# Patient Record
Sex: FEMALE | Race: WHITE | NOT HISPANIC OR LATINO | Employment: FULL TIME | URBAN - METROPOLITAN AREA
[De-identification: names, ages, dates, MRNs, and addresses within clinical notes are randomized per-mention and may not be internally consistent; named-entity substitution may affect disease eponyms.]

---

## 2017-02-24 ENCOUNTER — TELEPHONE (OUTPATIENT)
Dept: FAMILY MEDICINE CLINIC | Facility: CLINIC | Age: 67
End: 2017-02-24

## 2017-02-24 RX ORDER — FLUTICASONE PROPIONATE 50 MCG
SPRAY, SUSPENSION (ML) NASAL
Qty: 1 EACH | Refills: 5 | Status: SHIPPED | OUTPATIENT
Start: 2017-02-24

## 2017-02-24 NOTE — TELEPHONE ENCOUNTER
Patient has had allergy symptoms for the past 2 weeks she has tried OTC medications she is requesting something be called into pharmacy [facility]

## 2017-04-03 ENCOUNTER — OFFICE VISIT (OUTPATIENT)
Dept: FAMILY MEDICINE CLINIC | Facility: CLINIC | Age: 67
End: 2017-04-03

## 2017-04-03 VITALS
SYSTOLIC BLOOD PRESSURE: 158 MMHG | HEART RATE: 88 BPM | TEMPERATURE: 98.3 F | DIASTOLIC BLOOD PRESSURE: 90 MMHG | HEIGHT: 63 IN | WEIGHT: 203.4 LBS | OXYGEN SATURATION: 98 % | BODY MASS INDEX: 36.04 KG/M2

## 2017-04-03 DIAGNOSIS — Z00.00 ROUTINE GENERAL MEDICAL EXAMINATION AT A HEALTH CARE FACILITY: ICD-10-CM

## 2017-04-03 DIAGNOSIS — R53.83 FATIGUE, UNSPECIFIED TYPE: Primary | ICD-10-CM

## 2017-04-03 PROCEDURE — 99214 OFFICE O/P EST MOD 30 MIN: CPT | Performed by: NURSE PRACTITIONER

## 2017-04-03 NOTE — PROGRESS NOTES
"Subjective   Rochelle Montejo is a 66 y.o. female.     History of Present Illness   Patient presenting to the office today with feeling very bad and fatigue for the past 4 months.  Within the last 2 weeks as gotten much worse.  She's had the last 4 days off of work and she thought she would feel better but she does not feel any better at all.  She gets good sleep at least 78 hours every night and she still can wake up exhausted.  Her younger sister recently was diagnosed with Hashimoto's and she is wondering if she could possibly have the same thing.  That's the only person her family that she is aware of that has a thyroid problem.  She also has been expressing some dry skin she's unsure if she's had any weight changes.    The following portions of the patient's history were reviewed and updated as appropriate: allergies, current medications, past social history and problem list.    Review of Systems   Constitutional: Positive for fatigue.   All other systems reviewed and are negative.      Objective   /90 (BP Location: Right arm, Patient Position: Sitting)  Pulse 88  Temp 98.3 °F (36.8 °C)  Ht 63\" (160 cm)  Wt 203 lb 6.4 oz (92.3 kg)  SpO2 98%  BMI 36.03 kg/m2  Physical Exam   Constitutional: She is oriented to person, place, and time. Vital signs are normal. She appears well-developed and well-nourished. No distress.   HENT:   Head: Normocephalic.   Cardiovascular: Normal rate, regular rhythm and normal heart sounds.    Pulmonary/Chest: Effort normal and breath sounds normal.   Neurological: She is alert and oriented to person, place, and time. Gait normal.   Psychiatric: She has a normal mood and affect. Her behavior is normal. Judgment and thought content normal.   Vitals reviewed.      Assessment/Plan   Problem List Items Addressed This Visit        Other    Fatigue - Primary    Relevant Orders    CBC & Differential    TSH    Iron Profile    T4, free    T3, free    Thyroid Peroxidase Antibody    "   Other Visit Diagnoses     Routine general medical examination at a health care facility        Relevant Orders    CBC & Differential    Comprehensive Metabolic Panel    Hemoglobin A1c    Lipid Panel With LDL / HDL Ratio        Follow up after labs

## 2017-04-04 LAB
ALBUMIN SERPL-MCNC: 4.3 G/DL (ref 3.5–5.2)
ALBUMIN/GLOB SERPL: 1.7 G/DL
ALP SERPL-CCNC: 51 U/L (ref 39–117)
ALT SERPL-CCNC: 14 U/L (ref 1–33)
AST SERPL-CCNC: 13 U/L (ref 1–32)
BASOPHILS # BLD AUTO: 0.04 10*3/MM3 (ref 0–0.2)
BASOPHILS NFR BLD AUTO: 0.7 % (ref 0–1.5)
BILIRUB SERPL-MCNC: 0.2 MG/DL (ref 0.1–1.2)
BUN SERPL-MCNC: 15 MG/DL (ref 8–23)
BUN/CREAT SERPL: 22.4 (ref 7–25)
CALCIUM SERPL-MCNC: 9.5 MG/DL (ref 8.6–10.5)
CHLORIDE SERPL-SCNC: 103 MMOL/L (ref 98–107)
CHOLEST SERPL-MCNC: 192 MG/DL (ref 0–200)
CO2 SERPL-SCNC: 24.7 MMOL/L (ref 22–29)
CREAT SERPL-MCNC: 0.67 MG/DL (ref 0.57–1)
EOSINOPHIL # BLD AUTO: 0.27 10*3/MM3 (ref 0–0.7)
EOSINOPHIL NFR BLD AUTO: 4.9 % (ref 0.3–6.2)
ERYTHROCYTE [DISTWIDTH] IN BLOOD BY AUTOMATED COUNT: 13.9 % (ref 11.7–13)
GLOBULIN SER CALC-MCNC: 2.6 GM/DL
GLUCOSE SERPL-MCNC: 106 MG/DL (ref 65–99)
HBA1C MFR BLD: 5.27 % (ref 4.8–5.6)
HCT VFR BLD AUTO: 43.7 % (ref 35.6–45.5)
HDLC SERPL-MCNC: 54 MG/DL (ref 40–60)
HGB BLD-MCNC: 14.3 G/DL (ref 11.9–15.5)
IMM GRANULOCYTES # BLD: 0 10*3/MM3 (ref 0–0.03)
IMM GRANULOCYTES NFR BLD: 0 % (ref 0–0.5)
IRON SATN MFR SERPL: 15 % (ref 20–50)
IRON SERPL-MCNC: 51 MCG/DL (ref 37–145)
LDLC SERPL CALC-MCNC: 113 MG/DL (ref 0–100)
LDLC/HDLC SERPL: 2.1 {RATIO}
LYMPHOCYTES # BLD AUTO: 1.95 10*3/MM3 (ref 0.9–4.8)
LYMPHOCYTES NFR BLD AUTO: 35.5 % (ref 19.6–45.3)
MCH RBC QN AUTO: 30 PG (ref 26.9–32)
MCHC RBC AUTO-ENTMCNC: 32.7 G/DL (ref 32.4–36.3)
MCV RBC AUTO: 91.6 FL (ref 80.5–98.2)
MONOCYTES # BLD AUTO: 0.64 10*3/MM3 (ref 0.2–1.2)
MONOCYTES NFR BLD AUTO: 11.6 % (ref 5–12)
NEUTROPHILS # BLD AUTO: 2.6 10*3/MM3 (ref 1.9–8.1)
NEUTROPHILS NFR BLD AUTO: 47.3 % (ref 42.7–76)
PLATELET # BLD AUTO: 260 10*3/MM3 (ref 140–500)
POTASSIUM SERPL-SCNC: 4.3 MMOL/L (ref 3.5–5.2)
PROT SERPL-MCNC: 6.9 G/DL (ref 6–8.5)
RBC # BLD AUTO: 4.77 10*6/MM3 (ref 3.9–5.2)
SODIUM SERPL-SCNC: 140 MMOL/L (ref 136–145)
T3FREE SERPL-MCNC: 3 PG/ML (ref 2–4.4)
T4 FREE SERPL-MCNC: 1.32 NG/DL (ref 0.93–1.7)
THYROPEROXIDASE AB SERPL-ACNC: 117 IU/ML (ref 0–34)
TIBC SERPL-MCNC: 344 MCG/DL
TRIGL SERPL-MCNC: 123 MG/DL (ref 0–150)
TSH SERPL DL<=0.005 MIU/L-ACNC: 2.25 MIU/ML (ref 0.27–4.2)
UIBC SERPL-MCNC: 293 MCG/DL
VLDLC SERPL CALC-MCNC: 24.6 MG/DL (ref 5–40)
WBC # BLD AUTO: 5.5 10*3/MM3 (ref 4.5–10.7)

## 2017-04-08 ENCOUNTER — RESULTS ENCOUNTER (OUTPATIENT)
Dept: FAMILY MEDICINE CLINIC | Facility: CLINIC | Age: 67
End: 2017-04-08

## 2017-04-08 DIAGNOSIS — R53.83 FATIGUE, UNSPECIFIED TYPE: ICD-10-CM

## 2017-04-11 DIAGNOSIS — R76.8 THYROID ANTIBODY POSITIVE: Primary | ICD-10-CM

## 2023-05-13 ENCOUNTER — APPOINTMENT (OUTPATIENT)
Dept: ULTRASOUND IMAGING | Facility: HOSPITAL | Age: 73
End: 2023-05-13
Payer: MEDICARE

## 2023-05-13 ENCOUNTER — APPOINTMENT (OUTPATIENT)
Dept: GENERAL RADIOLOGY | Facility: HOSPITAL | Age: 73
End: 2023-05-13
Payer: MEDICARE

## 2023-05-13 ENCOUNTER — HOSPITAL ENCOUNTER (OUTPATIENT)
Facility: HOSPITAL | Age: 73
LOS: 1 days | Discharge: HOME | End: 2023-05-14
Attending: EMERGENCY MEDICINE | Admitting: SURGERY
Payer: MEDICARE

## 2023-05-13 ENCOUNTER — APPOINTMENT (OUTPATIENT)
Dept: CT IMAGING | Facility: HOSPITAL | Age: 73
End: 2023-05-13
Payer: MEDICARE

## 2023-05-13 ENCOUNTER — ANESTHESIA (OUTPATIENT)
Dept: PERIOP | Facility: HOSPITAL | Age: 73
End: 2023-05-13
Payer: MEDICARE

## 2023-05-13 ENCOUNTER — ANESTHESIA EVENT (OUTPATIENT)
Dept: PERIOP | Facility: HOSPITAL | Age: 73
End: 2023-05-13
Payer: MEDICARE

## 2023-05-13 DIAGNOSIS — K81.0 CHOLECYSTITIS, ACUTE: ICD-10-CM

## 2023-05-13 DIAGNOSIS — K81.9 CHOLECYSTITIS: Primary | ICD-10-CM

## 2023-05-13 LAB
ALBUMIN SERPL-MCNC: 4 G/DL (ref 3.5–5.2)
ALBUMIN/GLOB SERPL: 1.3 G/DL
ALP SERPL-CCNC: 64 U/L (ref 39–117)
ALT SERPL W P-5'-P-CCNC: 17 U/L (ref 1–33)
ANION GAP SERPL CALCULATED.3IONS-SCNC: 12.2 MMOL/L (ref 5–15)
AST SERPL-CCNC: 22 U/L (ref 1–32)
BACTERIA UR QL AUTO: ABNORMAL /HPF
BASOPHILS # BLD AUTO: 0.05 10*3/MM3 (ref 0–0.2)
BASOPHILS NFR BLD AUTO: 0.8 % (ref 0–1.5)
BILIRUB SERPL-MCNC: 0.3 MG/DL (ref 0–1.2)
BILIRUB UR QL STRIP: NEGATIVE
BUN SERPL-MCNC: 25 MG/DL (ref 8–23)
BUN/CREAT SERPL: 22.3 (ref 7–25)
CALCIUM SPEC-SCNC: 9.9 MG/DL (ref 8.6–10.5)
CHLORIDE SERPL-SCNC: 103 MMOL/L (ref 98–107)
CLARITY UR: CLEAR
CO2 SERPL-SCNC: 25.8 MMOL/L (ref 22–29)
COLOR UR: YELLOW
CREAT SERPL-MCNC: 1.12 MG/DL (ref 0.57–1)
DEPRECATED RDW RBC AUTO: 47.4 FL (ref 37–54)
EGFRCR SERPLBLD CKD-EPI 2021: 53 ML/MIN/1.73
EOSINOPHIL # BLD AUTO: 0.2 10*3/MM3 (ref 0–0.4)
EOSINOPHIL NFR BLD AUTO: 3.2 % (ref 0.3–6.2)
ERYTHROCYTE [DISTWIDTH] IN BLOOD BY AUTOMATED COUNT: 13.8 % (ref 12.3–15.4)
GLOBULIN UR ELPH-MCNC: 3.1 GM/DL
GLUCOSE SERPL-MCNC: 117 MG/DL (ref 65–99)
GLUCOSE UR STRIP-MCNC: NEGATIVE MG/DL
HCT VFR BLD AUTO: 40.1 % (ref 34–46.6)
HGB BLD-MCNC: 13.4 G/DL (ref 12–15.9)
HGB UR QL STRIP.AUTO: ABNORMAL
HYALINE CASTS UR QL AUTO: ABNORMAL /LPF
IMM GRANULOCYTES # BLD AUTO: 0.01 10*3/MM3 (ref 0–0.05)
IMM GRANULOCYTES NFR BLD AUTO: 0.2 % (ref 0–0.5)
KETONES UR QL STRIP: NEGATIVE
LEUKOCYTE ESTERASE UR QL STRIP.AUTO: ABNORMAL
LIPASE SERPL-CCNC: 30 U/L (ref 13–60)
LYMPHOCYTES # BLD AUTO: 1.73 10*3/MM3 (ref 0.7–3.1)
LYMPHOCYTES NFR BLD AUTO: 27.4 % (ref 19.6–45.3)
MCH RBC QN AUTO: 31 PG (ref 26.6–33)
MCHC RBC AUTO-ENTMCNC: 33.4 G/DL (ref 31.5–35.7)
MCV RBC AUTO: 92.8 FL (ref 79–97)
MONOCYTES # BLD AUTO: 0.94 10*3/MM3 (ref 0.1–0.9)
MONOCYTES NFR BLD AUTO: 14.9 % (ref 5–12)
NEUTROPHILS NFR BLD AUTO: 3.39 10*3/MM3 (ref 1.7–7)
NEUTROPHILS NFR BLD AUTO: 53.5 % (ref 42.7–76)
NITRITE UR QL STRIP: NEGATIVE
NRBC BLD AUTO-RTO: 0 /100 WBC (ref 0–0.2)
PH UR STRIP.AUTO: 7 [PH] (ref 4.5–8)
PLATELET # BLD AUTO: 234 10*3/MM3 (ref 140–450)
PMV BLD AUTO: 10.8 FL (ref 6–12)
POTASSIUM SERPL-SCNC: 3.1 MMOL/L (ref 3.5–5.2)
PROT SERPL-MCNC: 7.1 G/DL (ref 6–8.5)
PROT UR QL STRIP: NEGATIVE
QT INTERVAL: 398 MS
RBC # BLD AUTO: 4.32 10*6/MM3 (ref 3.77–5.28)
RBC # UR STRIP: ABNORMAL /HPF
REF LAB TEST METHOD: ABNORMAL
SODIUM SERPL-SCNC: 141 MMOL/L (ref 136–145)
SP GR UR STRIP: 1.01 (ref 1–1.03)
SQUAMOUS #/AREA URNS HPF: ABNORMAL /HPF
UROBILINOGEN UR QL STRIP: ABNORMAL
WBC # UR STRIP: ABNORMAL /HPF
WBC NRBC COR # BLD: 6.32 10*3/MM3 (ref 3.4–10.8)

## 2023-05-13 PROCEDURE — 25010000002 ONDANSETRON PER 1 MG: Performed by: EMERGENCY MEDICINE

## 2023-05-13 PROCEDURE — 87086 URINE CULTURE/COLONY COUNT: CPT | Performed by: EMERGENCY MEDICINE

## 2023-05-13 PROCEDURE — 25010000002 SUCCINYLCHOLINE PER 20 MG: Performed by: REGISTERED NURSE

## 2023-05-13 PROCEDURE — 74300 X-RAY BILE DUCTS/PANCREAS: CPT

## 2023-05-13 PROCEDURE — 94799 UNLISTED PULMONARY SVC/PX: CPT

## 2023-05-13 PROCEDURE — 83690 ASSAY OF LIPASE: CPT | Performed by: EMERGENCY MEDICINE

## 2023-05-13 PROCEDURE — 99285 EMERGENCY DEPT VISIT HI MDM: CPT

## 2023-05-13 PROCEDURE — 25010000002 KETOROLAC TROMETHAMINE PER 15 MG: Performed by: REGISTERED NURSE

## 2023-05-13 PROCEDURE — 25010000002 DEXAMETHASONE PER 1 MG: Performed by: REGISTERED NURSE

## 2023-05-13 PROCEDURE — 93010 ELECTROCARDIOGRAM REPORT: CPT | Performed by: INTERNAL MEDICINE

## 2023-05-13 PROCEDURE — 96374 THER/PROPH/DIAG INJ IV PUSH: CPT

## 2023-05-13 PROCEDURE — 76705 ECHO EXAM OF ABDOMEN: CPT

## 2023-05-13 PROCEDURE — 47563 LAPARO CHOLECYSTECTOMY/GRAPH: CPT | Performed by: SURGERY

## 2023-05-13 PROCEDURE — 93005 ELECTROCARDIOGRAM TRACING: CPT | Performed by: EMERGENCY MEDICINE

## 2023-05-13 PROCEDURE — 25010000002 NEOSTIGMINE 10 MG/10ML SOLUTION: Performed by: REGISTERED NURSE

## 2023-05-13 PROCEDURE — 25510000001 IOPAMIDOL 61 % SOLUTION: Performed by: SURGERY

## 2023-05-13 PROCEDURE — 88304 TISSUE EXAM BY PATHOLOGIST: CPT | Performed by: SURGERY

## 2023-05-13 PROCEDURE — 25510000001 IOPAMIDOL PER 1 ML: Performed by: EMERGENCY MEDICINE

## 2023-05-13 PROCEDURE — 25010000002 FENTANYL CITRATE (PF) 50 MCG/ML SOLUTION: Performed by: REGISTERED NURSE

## 2023-05-13 PROCEDURE — 25010000002 MIDAZOLAM PER 1MG: Performed by: REGISTERED NURSE

## 2023-05-13 PROCEDURE — 85025 COMPLETE CBC W/AUTO DIFF WBC: CPT | Performed by: EMERGENCY MEDICINE

## 2023-05-13 PROCEDURE — 81001 URINALYSIS AUTO W/SCOPE: CPT | Performed by: EMERGENCY MEDICINE

## 2023-05-13 PROCEDURE — 25010000002 PROPOFOL 200 MG/20ML EMULSION: Performed by: REGISTERED NURSE

## 2023-05-13 PROCEDURE — 25010000002 KETOROLAC TROMETHAMINE PER 15 MG: Performed by: EMERGENCY MEDICINE

## 2023-05-13 PROCEDURE — 0 CEFAZOLIN SODIUM-DEXTROSE 2-3 GM-%(50ML) RECONSTITUTED SOLUTION: Performed by: REGISTERED NURSE

## 2023-05-13 PROCEDURE — 94761 N-INVAS EAR/PLS OXIMETRY MLT: CPT

## 2023-05-13 PROCEDURE — 25010000002 ONDANSETRON PER 1 MG: Performed by: REGISTERED NURSE

## 2023-05-13 PROCEDURE — 74177 CT ABD & PELVIS W/CONTRAST: CPT

## 2023-05-13 PROCEDURE — 99223 1ST HOSP IP/OBS HIGH 75: CPT | Performed by: SURGERY

## 2023-05-13 PROCEDURE — 80053 COMPREHEN METABOLIC PANEL: CPT | Performed by: EMERGENCY MEDICINE

## 2023-05-13 PROCEDURE — 96375 TX/PRO/DX INJ NEW DRUG ADDON: CPT

## 2023-05-13 DEVICE — CLIP LIGAT VASC HORIZON TI MD/LG GRN 6CT: Type: IMPLANTABLE DEVICE | Site: ABDOMEN | Status: FUNCTIONAL

## 2023-05-13 RX ORDER — TRAMADOL HYDROCHLORIDE 50 MG/1
50 TABLET ORAL EVERY 6 HOURS PRN
Status: ON HOLD | COMMUNITY
End: 2023-05-14 | Stop reason: SDUPTHER

## 2023-05-13 RX ORDER — CALCIUM CARBONATE 500 MG/1
2 TABLET, CHEWABLE ORAL 2 TIMES DAILY PRN
Status: DISCONTINUED | OUTPATIENT
Start: 2023-05-13 | End: 2023-05-14 | Stop reason: HOSPADM

## 2023-05-13 RX ORDER — GLYCOPYRROLATE 0.2 MG/ML
INJECTION INTRAMUSCULAR; INTRAVENOUS AS NEEDED
Status: DISCONTINUED | OUTPATIENT
Start: 2023-05-13 | End: 2023-05-13 | Stop reason: SURG

## 2023-05-13 RX ORDER — HYDROMORPHONE HYDROCHLORIDE 1 MG/ML
0.5 INJECTION, SOLUTION INTRAMUSCULAR; INTRAVENOUS; SUBCUTANEOUS
Status: DISCONTINUED | OUTPATIENT
Start: 2023-05-13 | End: 2023-05-14 | Stop reason: HOSPADM

## 2023-05-13 RX ORDER — CEFAZOLIN SODIUM 2 G/50ML
SOLUTION INTRAVENOUS AS NEEDED
Status: DISCONTINUED | OUTPATIENT
Start: 2023-05-13 | End: 2023-05-13 | Stop reason: SURG

## 2023-05-13 RX ORDER — PROPOFOL 10 MG/ML
INJECTION, EMULSION INTRAVENOUS AS NEEDED
Status: DISCONTINUED | OUTPATIENT
Start: 2023-05-13 | End: 2023-05-13 | Stop reason: SURG

## 2023-05-13 RX ORDER — KETOROLAC TROMETHAMINE 30 MG/ML
INJECTION, SOLUTION INTRAMUSCULAR; INTRAVENOUS AS NEEDED
Status: DISCONTINUED | OUTPATIENT
Start: 2023-05-13 | End: 2023-05-13 | Stop reason: SURG

## 2023-05-13 RX ORDER — KETAMINE HYDROCHLORIDE 100 MG/ML
INJECTION INTRAMUSCULAR; INTRAVENOUS AS NEEDED
Status: DISCONTINUED | OUTPATIENT
Start: 2023-05-13 | End: 2023-05-13 | Stop reason: SURG

## 2023-05-13 RX ORDER — FAMOTIDINE 10 MG/ML
INJECTION, SOLUTION INTRAVENOUS AS NEEDED
Status: DISCONTINUED | OUTPATIENT
Start: 2023-05-13 | End: 2023-05-13 | Stop reason: SURG

## 2023-05-13 RX ORDER — SODIUM CHLORIDE 0.9 % (FLUSH) 0.9 %
10 SYRINGE (ML) INJECTION AS NEEDED
Status: DISCONTINUED | OUTPATIENT
Start: 2023-05-13 | End: 2023-05-14 | Stop reason: HOSPADM

## 2023-05-13 RX ORDER — KETOROLAC TROMETHAMINE 30 MG/ML
15 INJECTION, SOLUTION INTRAMUSCULAR; INTRAVENOUS ONCE
Status: COMPLETED | OUTPATIENT
Start: 2023-05-13 | End: 2023-05-13

## 2023-05-13 RX ORDER — SODIUM CHLORIDE, SODIUM LACTATE, POTASSIUM CHLORIDE, CALCIUM CHLORIDE 600; 310; 30; 20 MG/100ML; MG/100ML; MG/100ML; MG/100ML
9 INJECTION, SOLUTION INTRAVENOUS CONTINUOUS
Status: DISCONTINUED | OUTPATIENT
Start: 2023-05-13 | End: 2023-05-14 | Stop reason: HOSPADM

## 2023-05-13 RX ORDER — NEOSTIGMINE METHYLSULFATE 1 MG/ML
INJECTION, SOLUTION INTRAVENOUS AS NEEDED
Status: DISCONTINUED | OUTPATIENT
Start: 2023-05-13 | End: 2023-05-13 | Stop reason: SURG

## 2023-05-13 RX ORDER — SODIUM CHLORIDE 0.9 % (FLUSH) 0.9 %
10 SYRINGE (ML) INJECTION EVERY 12 HOURS SCHEDULED
Status: DISCONTINUED | OUTPATIENT
Start: 2023-05-13 | End: 2023-05-14 | Stop reason: HOSPADM

## 2023-05-13 RX ORDER — FENTANYL CITRATE 50 UG/ML
INJECTION, SOLUTION INTRAMUSCULAR; INTRAVENOUS AS NEEDED
Status: DISCONTINUED | OUTPATIENT
Start: 2023-05-13 | End: 2023-05-13 | Stop reason: SURG

## 2023-05-13 RX ORDER — SODIUM CHLORIDE, SODIUM LACTATE, POTASSIUM CHLORIDE, CALCIUM CHLORIDE 600; 310; 30; 20 MG/100ML; MG/100ML; MG/100ML; MG/100ML
100 INJECTION, SOLUTION INTRAVENOUS CONTINUOUS
Status: DISCONTINUED | OUTPATIENT
Start: 2023-05-13 | End: 2023-05-14 | Stop reason: HOSPADM

## 2023-05-13 RX ORDER — SUCCINYLCHOLINE CHLORIDE 20 MG/ML
INJECTION INTRAMUSCULAR; INTRAVENOUS AS NEEDED
Status: DISCONTINUED | OUTPATIENT
Start: 2023-05-13 | End: 2023-05-13 | Stop reason: SURG

## 2023-05-13 RX ORDER — ROCURONIUM BROMIDE 10 MG/ML
INJECTION, SOLUTION INTRAVENOUS AS NEEDED
Status: DISCONTINUED | OUTPATIENT
Start: 2023-05-13 | End: 2023-05-13 | Stop reason: SURG

## 2023-05-13 RX ORDER — IBUPROFEN 400 MG/1
400 TABLET ORAL EVERY 6 HOURS PRN
Status: DISCONTINUED | OUTPATIENT
Start: 2023-05-13 | End: 2023-05-14 | Stop reason: HOSPADM

## 2023-05-13 RX ORDER — ACETAMINOPHEN 500 MG
1000 TABLET ORAL 3 TIMES DAILY
Status: DISCONTINUED | OUTPATIENT
Start: 2023-05-13 | End: 2023-05-14 | Stop reason: HOSPADM

## 2023-05-13 RX ORDER — ONDANSETRON 2 MG/ML
INJECTION INTRAMUSCULAR; INTRAVENOUS AS NEEDED
Status: DISCONTINUED | OUTPATIENT
Start: 2023-05-13 | End: 2023-05-13 | Stop reason: SURG

## 2023-05-13 RX ORDER — SODIUM CHLORIDE 9 MG/ML
40 INJECTION, SOLUTION INTRAVENOUS AS NEEDED
Status: DISCONTINUED | OUTPATIENT
Start: 2023-05-13 | End: 2023-05-14 | Stop reason: HOSPADM

## 2023-05-13 RX ORDER — NALOXONE HCL 0.4 MG/ML
0.4 VIAL (ML) INJECTION
Status: DISCONTINUED | OUTPATIENT
Start: 2023-05-13 | End: 2023-05-14 | Stop reason: HOSPADM

## 2023-05-13 RX ORDER — ONDANSETRON 2 MG/ML
4 INJECTION INTRAMUSCULAR; INTRAVENOUS ONCE
Status: COMPLETED | OUTPATIENT
Start: 2023-05-13 | End: 2023-05-13

## 2023-05-13 RX ORDER — LIDOCAINE HYDROCHLORIDE 10 MG/ML
0.5 INJECTION, SOLUTION EPIDURAL; INFILTRATION; INTRACAUDAL; PERINEURAL ONCE AS NEEDED
Status: DISCONTINUED | OUTPATIENT
Start: 2023-05-13 | End: 2023-05-13 | Stop reason: HOSPADM

## 2023-05-13 RX ORDER — ONDANSETRON 2 MG/ML
4 INJECTION INTRAMUSCULAR; INTRAVENOUS EVERY 6 HOURS PRN
Status: DISCONTINUED | OUTPATIENT
Start: 2023-05-13 | End: 2023-05-14 | Stop reason: HOSPADM

## 2023-05-13 RX ORDER — ONDANSETRON 2 MG/ML
4 INJECTION INTRAMUSCULAR; INTRAVENOUS ONCE AS NEEDED
Status: DISCONTINUED | OUTPATIENT
Start: 2023-05-13 | End: 2023-05-13 | Stop reason: HOSPADM

## 2023-05-13 RX ORDER — TRAMADOL HYDROCHLORIDE 50 MG/1
50 TABLET ORAL EVERY 6 HOURS PRN
Status: DISCONTINUED | OUTPATIENT
Start: 2023-05-13 | End: 2023-05-14 | Stop reason: HOSPADM

## 2023-05-13 RX ORDER — MAGNESIUM HYDROXIDE 1200 MG/15ML
LIQUID ORAL AS NEEDED
Status: DISCONTINUED | OUTPATIENT
Start: 2023-05-13 | End: 2023-05-13 | Stop reason: HOSPADM

## 2023-05-13 RX ORDER — MIDAZOLAM HYDROCHLORIDE 2 MG/2ML
INJECTION, SOLUTION INTRAMUSCULAR; INTRAVENOUS AS NEEDED
Status: DISCONTINUED | OUTPATIENT
Start: 2023-05-13 | End: 2023-05-13 | Stop reason: SURG

## 2023-05-13 RX ORDER — ONDANSETRON 4 MG/1
4 TABLET, FILM COATED ORAL EVERY 6 HOURS PRN
Status: DISCONTINUED | OUTPATIENT
Start: 2023-05-13 | End: 2023-05-14 | Stop reason: HOSPADM

## 2023-05-13 RX ORDER — BUPIVACAINE HYDROCHLORIDE AND EPINEPHRINE 5; 5 MG/ML; UG/ML
INJECTION, SOLUTION EPIDURAL; INTRACAUDAL; PERINEURAL AS NEEDED
Status: DISCONTINUED | OUTPATIENT
Start: 2023-05-13 | End: 2023-05-13 | Stop reason: HOSPADM

## 2023-05-13 RX ORDER — DEXAMETHASONE SODIUM PHOSPHATE 4 MG/ML
INJECTION, SOLUTION INTRA-ARTICULAR; INTRALESIONAL; INTRAMUSCULAR; INTRAVENOUS; SOFT TISSUE AS NEEDED
Status: DISCONTINUED | OUTPATIENT
Start: 2023-05-13 | End: 2023-05-13 | Stop reason: SURG

## 2023-05-13 RX ORDER — SODIUM CHLORIDE 9 MG/ML
INJECTION, SOLUTION INTRAVENOUS AS NEEDED
Status: DISCONTINUED | OUTPATIENT
Start: 2023-05-13 | End: 2023-05-13 | Stop reason: HOSPADM

## 2023-05-13 RX ORDER — DEXMEDETOMIDINE HYDROCHLORIDE 100 UG/ML
INJECTION, SOLUTION INTRAVENOUS AS NEEDED
Status: DISCONTINUED | OUTPATIENT
Start: 2023-05-13 | End: 2023-05-13 | Stop reason: SURG

## 2023-05-13 RX ORDER — FENTANYL CITRATE 50 UG/ML
25 INJECTION, SOLUTION INTRAMUSCULAR; INTRAVENOUS
Status: DISCONTINUED | OUTPATIENT
Start: 2023-05-13 | End: 2023-05-13 | Stop reason: HOSPADM

## 2023-05-13 RX ORDER — PANTOPRAZOLE SODIUM 40 MG/1
40 TABLET, DELAYED RELEASE ORAL
Status: DISCONTINUED | OUTPATIENT
Start: 2023-05-13 | End: 2023-05-14 | Stop reason: HOSPADM

## 2023-05-13 RX ADMIN — DEXAMETHASONE SODIUM PHOSPHATE 4 MG: 4 INJECTION, SOLUTION INTRAMUSCULAR; INTRAVENOUS at 13:30

## 2023-05-13 RX ADMIN — MIDAZOLAM HYDROCHLORIDE 2 MG: 1 INJECTION, SOLUTION INTRAMUSCULAR; INTRAVENOUS at 13:37

## 2023-05-13 RX ADMIN — FENTANYL CITRATE 50 MCG: 50 INJECTION, SOLUTION INTRAMUSCULAR; INTRAVENOUS at 13:50

## 2023-05-13 RX ADMIN — DEXMEDETOMIDINE 4 MCG: 100 INJECTION, SOLUTION, CONCENTRATE INTRAVENOUS at 13:48

## 2023-05-13 RX ADMIN — KETOROLAC TROMETHAMINE 15 MG: 30 INJECTION, SOLUTION INTRAMUSCULAR; INTRAVENOUS at 07:40

## 2023-05-13 RX ADMIN — ONDANSETRON 4 MG: 2 INJECTION INTRAMUSCULAR; INTRAVENOUS at 13:30

## 2023-05-13 RX ADMIN — IOPAMIDOL 50 ML: 755 INJECTION, SOLUTION INTRAVENOUS at 09:00

## 2023-05-13 RX ADMIN — ACETAMINOPHEN 1000 MG: 500 TABLET ORAL at 16:58

## 2023-05-13 RX ADMIN — KETAMINE HYDROCHLORIDE 10 MG: 100 INJECTION INTRAMUSCULAR; INTRAVENOUS at 14:34

## 2023-05-13 RX ADMIN — DEXMEDETOMIDINE 4 MCG: 100 INJECTION, SOLUTION, CONCENTRATE INTRAVENOUS at 14:34

## 2023-05-13 RX ADMIN — KETAMINE HYDROCHLORIDE 10 MG: 100 INJECTION INTRAMUSCULAR; INTRAVENOUS at 13:40

## 2023-05-13 RX ADMIN — KETOROLAC TROMETHAMINE 15 MG: 30 INJECTION, SOLUTION INTRAMUSCULAR; INTRAVENOUS at 14:44

## 2023-05-13 RX ADMIN — SUCCINYLCHOLINE CHLORIDE 110 MG: 20 INJECTION, SOLUTION INTRAMUSCULAR; INTRAVENOUS at 13:40

## 2023-05-13 RX ADMIN — GLYCOPYRROLATE 0.6 MG: 0.2 INJECTION INTRAMUSCULAR; INTRAVENOUS at 14:41

## 2023-05-13 RX ADMIN — SODIUM CHLORIDE, POTASSIUM CHLORIDE, SODIUM LACTATE AND CALCIUM CHLORIDE 9 ML/HR: 600; 310; 30; 20 INJECTION, SOLUTION INTRAVENOUS at 13:17

## 2023-05-13 RX ADMIN — CEFAZOLIN SODIUM 2 G: 2 SOLUTION INTRAVENOUS at 13:45

## 2023-05-13 RX ADMIN — KETAMINE HYDROCHLORIDE 10 MG: 100 INJECTION INTRAMUSCULAR; INTRAVENOUS at 14:14

## 2023-05-13 RX ADMIN — PROPOFOL 50 MG: 10 INJECTION, EMULSION INTRAVENOUS at 13:50

## 2023-05-13 RX ADMIN — Medication 10 ML: at 21:01

## 2023-05-13 RX ADMIN — PANTOPRAZOLE SODIUM 40 MG: 40 TABLET, DELAYED RELEASE ORAL at 22:08

## 2023-05-13 RX ADMIN — NEOSTIGMINE METHYLSULFATE 4 MG: 0.5 INJECTION INTRAVENOUS at 14:41

## 2023-05-13 RX ADMIN — ONDANSETRON 4 MG: 2 INJECTION INTRAMUSCULAR; INTRAVENOUS at 07:39

## 2023-05-13 RX ADMIN — SODIUM CHLORIDE, POTASSIUM CHLORIDE, SODIUM LACTATE AND CALCIUM CHLORIDE 100 ML/HR: 600; 310; 30; 20 INJECTION, SOLUTION INTRAVENOUS at 15:58

## 2023-05-13 RX ADMIN — FENTANYL CITRATE 50 MCG: 50 INJECTION, SOLUTION INTRAMUSCULAR; INTRAVENOUS at 13:40

## 2023-05-13 RX ADMIN — TRAMADOL HYDROCHLORIDE 50 MG: 50 TABLET ORAL at 17:19

## 2023-05-13 RX ADMIN — DEXMEDETOMIDINE 4 MCG: 100 INJECTION, SOLUTION, CONCENTRATE INTRAVENOUS at 14:15

## 2023-05-13 RX ADMIN — FAMOTIDINE 20 MG: 10 INJECTION INTRAVENOUS at 13:31

## 2023-05-13 RX ADMIN — ACETAMINOPHEN 1000 MG: 500 TABLET ORAL at 21:01

## 2023-05-13 RX ADMIN — DEXMEDETOMIDINE 4 MCG: 100 INJECTION, SOLUTION, CONCENTRATE INTRAVENOUS at 13:41

## 2023-05-13 RX ADMIN — PROPOFOL 175 MG: 10 INJECTION, EMULSION INTRAVENOUS at 13:40

## 2023-05-13 RX ADMIN — ROCURONIUM BROMIDE 35 MG: 10 INJECTION INTRAVENOUS at 13:47

## 2023-05-13 RX ADMIN — FENTANYL CITRATE 50 MCG: 50 INJECTION, SOLUTION INTRAMUSCULAR; INTRAVENOUS at 14:13

## 2023-05-13 NOTE — ANESTHESIA POSTPROCEDURE EVALUATION
Patient: Rochelle Montejo    Procedure Summary     Date: 05/13/23 Room / Location:  [facility] /  [facility]    Anesthesia Start: 1338 Anesthesia Stop: 1505    Procedure: CHOLECYSTECTOMY LAPAROSCOPIC INTRAOPERATIVE CHOLANGIOGRAM (Abdomen) Diagnosis:       Cholecystitis, acute      (acute cholecystitis)    Surgeons: Desi De Dios MD Provider: Omid Sloan CRNA    Anesthesia Type: general ASA Status: 2          Anesthesia Type: general    Vitals  Vitals Value Taken Time   /57 05/13/23 1535   Temp 98.4 °F (36.9 °C) 05/13/23 1507   Pulse 76 05/13/23 1537   Resp 20 05/13/23 1535   SpO2 95 % 05/13/23 1537   Vitals shown include unvalidated device data.        Post Anesthesia Care and Evaluation    Patient location during evaluation: bedside  Patient participation: complete - patient participated  Level of consciousness: awake and alert  Pain score: 0  Pain management: adequate    Airway patency: patent  Anesthetic complications: No anesthetic complications  PONV Status: none  Cardiovascular status: acceptable  Respiratory status: acceptable  Hydration status: acceptable

## 2023-05-13 NOTE — ED NOTES
"Pt continues to rock back and forth in bed c/o \"severe pain and the pain medication hasn't done anything\" Dr Piña notified  "

## 2023-05-13 NOTE — ANESTHESIA PROCEDURE NOTES
Airway  Date/Time: 5/13/2023 1:41 PM  Airway not difficult    General Information and Staff    Patient location during procedure: OR  CRNA/CAA: Omid Sloan CRNA    Indications and Patient Condition  Indications for airway management: airway protection    Preoxygenated: yes  MILS maintained throughout  Mask difficulty assessment: 1 - vent by mask    Final Airway Details  Final airway type: endotracheal airway      Successful airway: ETT  Cuffed: yes   Successful intubation technique: direct laryngoscopy  Facilitating devices/methods: intubating stylet  Endotracheal tube insertion site: oral  Blade: Cross  Blade size: 2  ETT size (mm): 7.5  Cormack-Lehane Classification: grade IIa - partial view of glottis  Placement verified by: chest auscultation and capnometry   Measured from: lips  ETT/EBT  to lips (cm): 22  Number of attempts at approach: 1  Assessment: lips, teeth, and gum same as pre-op

## 2023-05-13 NOTE — ED NOTES
"Pt laying in bed, pt  at bedside, pt reports \"pain much better rating it a \"2\" on 0-10 pain scale  "

## 2023-05-13 NOTE — ED PROVIDER NOTES
Subjective   History of Present Illness  History of Present Illness    Chief complaint: Abdominal pain    Location: Right upper quadrant, radiating to the right foot    Quality/Severity: 10/10 at its worst, 10/10 currently sharp    Timing/Onset/Duration: Started at 5 AM    Modifying Factors: Nothing makes it better    Associated Symptoms: No headache.  No fever chills or cough.  No sore throat earache or nasal congestion.  No chest pain or shortness of breath.  Patient has had nausea and vomiting.  Nonbloody emesis.  No diarrhea or burning when she urinates.  No black or bloody stools.    Narrative: This 72-year-old white female with a history of renal calculi, presents with right flank pain that started at 5 this morning.  Patient took a tramadol at home without relief.  She states this pain is different than her kidney stone pain  PCP:Moose Gross APRN  Review of Systems   Constitutional: Negative for chills and fever.   HENT: Negative for congestion, ear pain and sore throat.    Respiratory: Negative for shortness of breath.    Cardiovascular: Negative for chest pain.   Gastrointestinal: Positive for abdominal pain, nausea and vomiting. Negative for diarrhea.   Genitourinary: Negative for difficulty urinating.   Musculoskeletal: Negative for back pain.   Skin: Negative for rash.   Neurological: Negative for headaches.        Medication List      ASK your doctor about these medications    fluticasone 50 MCG/ACT nasal spray  Commonly known as: Flonase  1 spray each nostril BID     multivitamin tablet tablet  Commonly known as: THERAGRAN            Past Medical History:   Diagnosis Date   • H/O colonoscopy    • Renal calculi        Allergies   Allergen Reactions   • No Known Drug Allergy        Past Surgical History:   Procedure Laterality Date   • MOUTH SURGERY      TOOTH EXTRACTION       Family History   Problem Relation Age of Onset   • Other Mother         MALIGNANT NEOPLASM    • Cancer Mother    •  Hypertension Father    • Other Father         MALIGNANT NEOPLASM    • Cancer Father    • No Known Problems Maternal Grandmother    • No Known Problems Maternal Grandfather    • Other Paternal Grandmother         MALIGNANT NEOPLASM    • No Known Problems Paternal Grandfather        Social History     Socioeconomic History   • Marital status:    Tobacco Use   • Smoking status: Never   • Smokeless tobacco: Never   Substance and Sexual Activity   • Alcohol use: No   • Drug use: No   • Sexual activity: Yes     Partners: Male     Birth control/protection: Surgical           Objective   Physical Exam  Vitals (The temperature is 97.9 °F, pulse 79, respirations 24, /91, room air pulse ox 100%) and nursing note reviewed.   Constitutional:       Appearance: She is well-developed.      Comments: Patient is writhing on the cart   HENT:      Head: Normocephalic and atraumatic.      Mouth/Throat:      Mouth: Mucous membranes are moist.   Cardiovascular:      Rate and Rhythm: Normal rate and regular rhythm.      Heart sounds: Normal heart sounds. No murmur heard.    No friction rub. No gallop.   Pulmonary:      Effort: Pulmonary effort is normal.      Breath sounds: Normal breath sounds.   Abdominal:      General: Abdomen is flat. Bowel sounds are normal.      Palpations: Abdomen is soft.      Tenderness: There is abdominal tenderness (Moderate right upper quadrant).      Hernia: No hernia is present.   Skin:     General: Skin is warm and dry.      Findings: No rash.   Neurological:      General: No focal deficit present.      Mental Status: She is alert and oriented to person, place, and time.         Procedures           ED Course  ED Course as of 05/13/23 0922   Sat May 13, 2023   0806 The CBC is unremarkable. [RC]   0806 The glucose is 117, BUN 25, creatinine 1.12, potassium 3.1, GFR 53.  The CMP is otherwise unremarkable.    The lipase is normal at 30 [RC]   0832 The urine microscopic shows 3-5 red blood cells,  3-5 white blood cells, 1+ bacteria, 3-6 squamous epithelial cells.  There is trace leukocytes, nitrite negative urinalysis is otherwise unremarkable. [RC]      ED Course User Index  [RC] Paddy Piña MD      07:57 EDT, 05/13/23:  The EKG was obtained at 748 and read by me at 750.  The EKG shows a normal sinus rhythm with rate of 79.  There is a normal axis with no hypertrophy.  The NE, QRS, QT intervals are unremarkable.  There is no ectopy.  There is no acute ST elevation or depression     09:28 EDT, 05/13/23:  The patient was reassessed.  She rates her pain as 5/10.  Her vital signs were reviewed and are stable.  Abdominal exam: Soft, moderate right upper quadrant tenderness, no rebound, no guarding, no masses, positive bowel sounds.  The plan will be to obtain an ultrasound of the gallbladder.  Patient does not want anything more for pain at this time.     11:35 EDT, 05/13/23:  The patient was reassessed.  She rates her pain as 2/10.  Her vital signs were reviewed and are stable    11:35 EDT, 05/13/23:  I spoke with Dr. De Dios, on-call for general surgery, she will come and evaluate the patient.    11:36 EDT, 05/13/23:  The patient's diagnosis of right upper quadrant abdominal pain and cholecystitis was discussed with her.  The treatment plan was discussed with her.  All of the patient's questions were answered.  She is in stable condition                           MDM    Final diagnoses:   Cholecystitis       ED Disposition  ED Disposition     None          No follow-up provider specified.       Medication List      No changes were made to your prescriptions during this visit.          Paddy Piña MD  05/13/23 8570

## 2023-05-13 NOTE — H&P
General Surgery H&P      CC: Right upper quadrant abdominal pain    HPI:   The patient is a very pleasant 72 y.o. female who presented to the hospital with severe right upper quadrant abdominal pain since early this morning.  She reports associated nausea and 1 episode of bilious emesis.  She denies previous episodes of abdominal pain.  She does report recent history of back pain.  No fevers or chills.  No diarrhea.    Past Medical History:  Hypertension  Hyperlipidemia  Kidney stone      Past Surgical History:  Back surgery  Tooth extraction  Colonoscopy      Medications:  Medications Prior to Admission   Medication Sig Dispense Refill Last Dose   • fluticasone (FLONASE) 50 MCG/ACT nasal spray 1 spray each nostril BID 1 each 5 Past Week   • traMADol (ULTRAM) 50 MG tablet Take 1 tablet by mouth Every 6 (Six) Hours As Needed for Moderate Pain.   5/13/2023 at 0500   • MULTIPLE VITAMIN PO Take 1 tablet by mouth Daily.          Allergies:   Allergies   Allergen Reactions   • Norco [Hydrocodone-Acetaminophen] Hallucinations       Social History:   Social History     Socioeconomic History   • Marital status:    Tobacco Use   • Smoking status: Never   • Smokeless tobacco: Never   Substance and Sexual Activity   • Alcohol use: No   • Drug use: No   • Sexual activity: Yes     Partners: Male     Birth control/protection: Surgical       Family History:     Family History   Problem Relation Age of Onset   • Other Mother         MALIGNANT NEOPLASM    • Cancer Mother    • Hypertension Father    • Other Father         MALIGNANT NEOPLASM    • Cancer Father    • No Known Problems Maternal Grandmother    • No Known Problems Maternal Grandfather    • Other Paternal Grandmother         MALIGNANT NEOPLASM    • No Known Problems Paternal Grandfather        Review of Systems:  Constitutional: denies any weight changes, fatigue, or weakness  Eyes: denies blurred/double vision or scleral icterus  Cardiovascular: denies chest pain,  palpitations, or edemas  Respiratory: denies cough, sputum, or dyspnea  Gastrointestinal: Reports abdominal pain, nausea, vomiting  Genitourinary: denies dysuria or hematuria  Endocrine: denies cold intolerance, lethargy, or flushing  Hematologic: denies excessive bruising or bleeding  Musculoskeletal: denies weakness, joint swelling, joint pain, or stiffness  Neurologic: denies seizures, CVA, paresthesia, or peripheral neuropathy  Skin: denies change in nevi, rashes, masses, or jaundice     All other systems reviewed and were negative.    Physical Exam:   Vitals:    05/13/23 1300   BP: 147/97   Pulse: 95   Resp: 20   Temp: 98.4 °F (36.9 °C)   SpO2: 99%     Height: 162  Weight: 75 kg  BMI: 28  GENERAL: awake and alert, no acute distress, oriented to person, place, and time  HEENT: normocephalic, atraumatic, no scleral icterus, moist mucous membranes  NECK: Supple, there is no thyromegaly or lymphadenopathy  RESPIRATORY: clear to auscultation, no wheezes, rales or rhonchi, symmetric air entry  CARDIOVASCULAR: regular rate and rhythm    GASTROINTESTINAL: Soft, nondistended, tender palpation in the right upper quadrant with positive James sign  MUSCULOSKELETAL: no cyanosis, clubbing, or edema   NEUROLOGIC: alert and oriented, normal speech, cranial nerves 2-12 grossly intact, no focal deficits   SKIN: Moist, warm, no rashes, no jaundice      Diagnostic work-up:     Pertinent labs:   Results from last 7 days   Lab Units 05/13/23  0743   WBC 10*3/mm3 6.32   HEMOGLOBIN g/dL 13.4   HEMATOCRIT % 40.1   PLATELETS 10*3/mm3 234     Results from last 7 days   Lab Units 05/13/23  0743   SODIUM mmol/L 141   POTASSIUM mmol/L 3.1*   CHLORIDE mmol/L 103   CO2 mmol/L 25.8   BUN mg/dL 25*   CREATININE mg/dL 1.12*   CALCIUM mg/dL 9.9   BILIRUBIN mg/dL 0.3   ALK PHOS U/L 64   ALT (SGPT) U/L 17   AST (SGOT) U/L 22   GLUCOSE mg/dL 117*       Imaging:  I reviewed the CT abdomen pelvis, as well as the right upper quadrant ultrasound.  The  gallbladder appears distended with wall thickening and some pericholecystic fluid.  On ultrasound there are stones present.    Assessment and plan:   The patient is a 72 y.o. female with acute cholecystitis.    I recommended proceeding with laparoscopic cholecystectomy with intraoperative cholangiogram.  The procedure was explained in detail and all questions addressed.  All risks (bleeding, infection, damage to surrounding structures including the liver, stomach, intestines, common bile duct), benefits and alternatives were discussed.  She verbalized understanding and elected to proceed.      Desi D eDios MD  General, Robotic, and Endoscopic Surgery  [facility]     [address], [address]  [place], [place], [address]  P: [phone]  F: [phone]

## 2023-05-13 NOTE — NURSING NOTE
Pt refusing activity and up in chair with Nereida this shift; Discussed reasons for getting OOB.  Daughter at bedside. Still refusing up in chair. Dr Cao notified.

## 2023-05-13 NOTE — ANESTHESIA PREPROCEDURE EVALUATION
Anesthesia Evaluation     Patient summary reviewed and Nursing notes reviewed   NPO Solid Status: > 8 hours  NPO Liquid Status: > 8 hours           Airway   Mallampati: II  TM distance: <3 FB  Neck ROM: full  Possible difficult intubation  Dental - normal exam     Pulmonary - negative pulmonary ROS and normal exam   Cardiovascular - normal exam  Exercise tolerance: good (4-7 METS)    ECG reviewed      ROS comment: NORMAL ECG -  Sinus rhythm  Electronically Signed By:   Date and Time of Study: 2023-05-13 07:48:41    Specimen Collected: 05/13/23 07:48 EDT Last Resulted: 05/13/23 07:48 EDT            Neuro/Psych- negative ROS  GI/Hepatic/Renal/Endo    (+)  GERD well controlled,  renal disease stones,     Musculoskeletal     (+) back pain,   Abdominal  - normal exam   Substance History      OB/GYN          Other - negative ROS                       Anesthesia Plan    ASA 2     general     intravenous induction     Anesthetic plan, risks, benefits, and alternatives have been provided, discussed and informed consent has been obtained with: patient.    Use of blood products discussed with patient  Consented to blood products.       CODE STATUS:

## 2023-05-13 NOTE — OP NOTE
OPERATIVE REPORT     Rochelle Montejo  1950    Procedure Date: 05/13/23    Pre-op Diagnosis:  acute cholecystitis    Post-op Diagnosis:  Post-Op Diagnosis Codes:     * Cholecystitis, acute [K81.0]    Procedure:   Laparoscopic Cholecystectomy with Intraoperative Cholangiogram    Surgeon: Desi De Dios MD    Assistant: Luiz Houston CSA was responsible for performing the following activities: Suction, Irrigation, Suturing, Closing and Held/Positioned Camera and their skilled assistance was necessary for the success of this case.      Anesthesia: General    Specimen: Gallbladder    Complications: None    Estimated Blood Loss: minimal    Indications for Operation: Rochelle Montejo is a 72 y.o. year old female who presented with acute cholecystitis.  I recommended proceeding with laparoscopic cholecystectomy with intraoperative cholangiogram.  All risks (including bleeding, infection, damage to surrounding structures), benefits, and alternatives were explained to the patient and she agreed and wished to proceed.  Informed consent was obtained.    Description of Procedure: The patient was taken to the operating room, transferred onto the operating room table, and underwent general endotracheal anesthesia without incident. The patient was prepped and draped in the usual sterile fashion.  Preoperative antibiotics were given, and a timeout was performed.  Half percent Marcaine with epinephrine was injected into the skin and subcutaneous tissues.  Incision was made superior to the umbilicus, and the Veress needle was used to initiate pneumoperitoneum.  An Optiview trocar with a 5mm 30 scope was inserted through each layer of the abdominal wall individually and into the abdomen.  The area under insertion was inspected and no injuries were noted.  Two 5 mm ports were placed in the right upper and right lateral abdomen under direct visualization.  An 11 mm subxiphoid trocar was placed.  The gallbladder appeared very  inflamed, with parts of the wall partially necrotic.  The gallbladder was retracted cranially and laterally to allow for adequate visualization.  The area around the infundibulum was dissected until the cystic duct and artery were identified. The critical view was obtained.     A cholangiogram was done by placing a clip on the cystic duct and incising it just distal to this.  A cholangiogram catheter was introduced with an Angiocath needle and directed into the cystic duct.  A clip was applied.  With fluoroscopy, contrast was injected and confirmed the anatomy and that there were no stones present.  Contrast was seen going into common bile duct as well as the duodenum.  There was minimal reflux of contrast into the common hepatic duct.  The cholangiogram catheter was then removed.  The cystic duct had 2 clips placed on the bile duct side and was transected.  The same was done for the cystic artery.  Bovie electrocautery was then used to remove the gallbladder from the liver bed.  The gallbladder was placed into a bag.  The area was then irrigated and inspected for hemostasis.  There was no bleeding or bile noted.  The gallbladder was then removed through the subxiphoid port.  The ports were removed under direct visualization. The fascia of the subxiphoid port was closed with 0 Vicryl suture. The incisions were then closed with 4-0 Monocryl sutures and Dermabond.  All needle and lap counts were correct at the end of the case.  The patient was awoken from general endotracheal anesthesia and taken to the recovery area for further monitoring.      MALIK JIANG M.D.  General, Robotic, and Endoscopic Surgery  [facility]    [address], [address]  [place], [place], [address]  P: [phone]  F: [phone]

## 2023-05-13 NOTE — PLAN OF CARE
Goal Outcome Evaluation:  Plan of Care Reviewed With: patient, spouse        Progress: improving  Outcome Evaluation: IVFs cont; pain controlled; labs in am; a nticipated D/C tomorrow

## 2023-05-14 VITALS
HEART RATE: 77 BPM | WEIGHT: 164.9 LBS | RESPIRATION RATE: 20 BRPM | SYSTOLIC BLOOD PRESSURE: 124 MMHG | TEMPERATURE: 99.1 F | OXYGEN SATURATION: 97 % | BODY MASS INDEX: 28.15 KG/M2 | DIASTOLIC BLOOD PRESSURE: 67 MMHG | HEIGHT: 64 IN

## 2023-05-14 LAB
ALBUMIN SERPL-MCNC: 3.1 G/DL (ref 3.5–5.2)
ALBUMIN/GLOB SERPL: 1.1 G/DL
ALP SERPL-CCNC: 50 U/L (ref 39–117)
ALT SERPL W P-5'-P-CCNC: 21 U/L (ref 1–33)
ANION GAP SERPL CALCULATED.3IONS-SCNC: 10.7 MMOL/L (ref 5–15)
AST SERPL-CCNC: 29 U/L (ref 1–32)
BACTERIA SPEC AEROBE CULT: NORMAL
BILIRUB SERPL-MCNC: 0.4 MG/DL (ref 0–1.2)
BUN SERPL-MCNC: 20 MG/DL (ref 8–23)
BUN/CREAT SERPL: 21.1 (ref 7–25)
CALCIUM SPEC-SCNC: 8.9 MG/DL (ref 8.6–10.5)
CHLORIDE SERPL-SCNC: 103 MMOL/L (ref 98–107)
CO2 SERPL-SCNC: 25.3 MMOL/L (ref 22–29)
CREAT SERPL-MCNC: 0.95 MG/DL (ref 0.57–1)
EGFRCR SERPLBLD CKD-EPI 2021: 64.6 ML/MIN/1.73
GLOBULIN UR ELPH-MCNC: 2.9 GM/DL
GLUCOSE SERPL-MCNC: 123 MG/DL (ref 65–99)
POTASSIUM SERPL-SCNC: 3.6 MMOL/L (ref 3.5–5.2)
PROT SERPL-MCNC: 6 G/DL (ref 6–8.5)
SODIUM SERPL-SCNC: 139 MMOL/L (ref 136–145)

## 2023-05-14 PROCEDURE — 25010000002 HYDROMORPHONE PER 4 MG: Performed by: SURGERY

## 2023-05-14 PROCEDURE — 80053 COMPREHEN METABOLIC PANEL: CPT | Performed by: SURGERY

## 2023-05-14 PROCEDURE — 99024 POSTOP FOLLOW-UP VISIT: CPT | Performed by: SURGERY

## 2023-05-14 PROCEDURE — G0378 HOSPITAL OBSERVATION PER HR: HCPCS

## 2023-05-14 RX ORDER — ONDANSETRON 4 MG/1
4 TABLET, FILM COATED ORAL EVERY 8 HOURS PRN
Qty: 20 TABLET | Refills: 0 | Status: SHIPPED | OUTPATIENT
Start: 2023-05-14 | End: 2023-05-21

## 2023-05-14 RX ORDER — TRAMADOL HYDROCHLORIDE 50 MG/1
50 TABLET ORAL EVERY 6 HOURS PRN
Qty: 15 TABLET | Refills: 0 | Status: SHIPPED | OUTPATIENT
Start: 2023-05-14

## 2023-05-14 RX ADMIN — IBUPROFEN 400 MG: 400 TABLET, FILM COATED ORAL at 06:41

## 2023-05-14 RX ADMIN — HYDROMORPHONE HYDROCHLORIDE 0.5 MG: 1 INJECTION, SOLUTION INTRAMUSCULAR; INTRAVENOUS; SUBCUTANEOUS at 06:47

## 2023-05-14 RX ADMIN — TRAMADOL HYDROCHLORIDE 50 MG: 50 TABLET ORAL at 01:37

## 2023-05-14 RX ADMIN — Medication 10 ML: at 08:29

## 2023-05-14 RX ADMIN — ACETAMINOPHEN 1000 MG: 500 TABLET ORAL at 08:28

## 2023-05-14 RX ADMIN — PANTOPRAZOLE SODIUM 40 MG: 40 TABLET, DELAYED RELEASE ORAL at 06:41

## 2023-05-14 RX ADMIN — SODIUM CHLORIDE, POTASSIUM CHLORIDE, SODIUM LACTATE AND CALCIUM CHLORIDE 100 ML/HR: 600; 310; 30; 20 INJECTION, SOLUTION INTRAVENOUS at 01:37

## 2023-05-14 NOTE — CASE MANAGEMENT/SOCIAL WORK
Continued Stay Note  MARLEN Power     Patient Name: Rochelle Montejo  MRN: [id number]  Today's Date: 5/14/2023    Admit Date: 5/13/2023    Plan: plan home with    Discharge Plan     Row Name 05/14/23 7298       Plan    Plan plan home with     Patient/Family in Agreement with Plan yes    Plan Comments Arrived to patient room, she is standing at bedside table, dressed and putting on earrings. Patient is being dc'd and denies any needs at this time.               Discharge Codes    No documentation.               Expected Discharge Date and Time     Expected Discharge Date Expected Discharge Time    May 14, 2023             Feng Lott RN

## 2023-05-14 NOTE — DISCHARGE INSTRUCTIONS
Dr. Desi De Dios  [address]  [place] [address]  [phone]    Discharge Instructions: Gall Bladder Surgery    Go home, rest and take it easy today; however, you should get up and move about several times today to reduce the risk of developing a blood clot in your legs.   You may experience some dizziness or memory loss from the anesthesia. This may last for the next 24 hours. Someone should plan on staying with you for the first 24 hours for your safety.   Do not make any important legal decisions or sign any legal papers for the next 24 hours.   Eat and drink lightly today. Start off with liquids, jello, soup, crackers or other bland foods at first. You may advance your diet tomorrow as tolerated as long as you do not experience any nausea or vomiting.   Your incisions are covered with skin glue. They will fall off on their own in 1-2 weeks. Do not worry if they come off sooner.   You may notice some bleeding/drainage on your outer dressings. A little bloody drainage is normal. If the bleeding/drainage is such that the bandage cannot absorb it, remove the dressing, apply clean gauze and apply firm pressure for a full 15 minutes. If the bleeding continues, please call me.   You may shower 24hr after surgery. No tub baths until your incisions are completely healed.   You have received a prescription for a narcotic pain medicine, as you will have some pain following surgery.  You will not be totally pain free, but your pain medicine should make the pain tolerable. Please take your pain medicine as prescribed and always take your pills with food to prevent nausea. If you are having severe pain that cannot be controlled by the pain medicine, please contact me.   Narcotic pain medicine can cause constipation. Take an over the counter stool softener, like Miaralax or docusate to prevent constipation while taking narcotics.  You have also received a prescription for an anti-nausea medicine. Please  take this as prescribed for any nausea or vomiting. Nausea could be a result of the anesthesia or a result of the narcotic pain medicine. If you experience severe nausea and vomiting that cannot be controlled by the nausea medicine, please call me.   It is not unusual to experience pain/discomfort in your shoulders or your ribs after surgery. It is from the gas used during the laparoscopic procedure and usually lasts 1-3 days. The prescription pain medicine is used to treat the surgical pain and does not typically alleviate this “gassy” pain.   No driving for 24 hours and for as long as you are taking your prescription pain medicine.   You will need to call the office at [phone] to schedule a follow-up appointment in 2 weeks.  Remember to contact me for any of the following:   Fever > 101 degrees  Severe pain that cannot be controlled by taking your pain pills  Severe nausea or vomiting that cannot be controlled by taking your nausea pills  Significant bleeding of your incisions  Drainage that has a bad smell or is yellow or green in appearance  Any other questions or concerns

## 2023-05-14 NOTE — PLAN OF CARE
Goal Outcome Evaluation:  Plan of Care Reviewed With: patient        Progress: improving  Outcome Evaluation: VS stable, continous IV fluids, tolerating regular diet. Adequate urine ouptput. Lap sites clean/dry/intact.

## 2023-05-14 NOTE — DISCHARGE SUMMARY
GENERAL SURGERY      DATE OF ADMISSION:  5/13/2023  DATE OF DISCHARGE:  5/14/2023    ATTENDING:     Desi De Dios M.D.           CONSULTS:    None    PRINCIPAL DIAGNOSIS:     Acute cholecystitis    DISCHARGE DIAGNOSES:     Same    HOSPITAL PROCEDURES:     Laparoscopic cholecystectomy with intraoperative cholangiogram    HOSPITAL COURSE:   She underwent the above procedure without complication.  She was admitted postoperatively given the degree of cholecystitis and to repeat labs on postop day 1.  Her CMP was normal, her pain was controlled, and she tolerated diet.  She is deemed appropriate for discharge.  Her incisions were clean, dry and intact.    ACTIVITY:  Okay to shower.  Ambulate and climb stairs as tolerated.  No lifting over 10 lbs for 2 weeks.  Okay to drive if not taking pain medications.    DIET:  Regular diet as tolerated.  Counseled her on avoiding greasy/fatty foods.    FOLLOW UP:  With Dr. De Dios in 2 weeks. Instructed to call [phone] for an appointment.

## 2023-05-15 ENCOUNTER — READMISSION MANAGEMENT (OUTPATIENT)
Dept: CALL CENTER | Facility: HOSPITAL | Age: 73
End: 2023-05-15
Payer: MEDICARE

## 2023-05-15 NOTE — OUTREACH NOTE
Internal Medicine Prep Survey    Flowsheet Row Responses   [facility] patient discharged from? [facility]   Is LACE score < 7 ? Yes   Eligibility Readm Mgmt   Discharge diagnosis Laparoscopic cholecystectomy with intraoperative cholangiogram   Does the patient have one of the following disease processes/diagnoses(primary or secondary)? General Surgery   Does the patient have Home health ordered? No   Is there a DME ordered? No   Prep survey completed? Yes          NELSY BRAN - Registered Nurse

## 2023-05-15 NOTE — CASE MANAGEMENT/SOCIAL WORK
Case Management Discharge Note      Final Note: Discharged home.         Selected Continued Care - Discharged on 5/14/2023 Admission date: 5/13/2023 - Discharge disposition: Home or Self Care    Destination    No services have been selected for the patient.              Durable Medical Equipment    No services have been selected for the patient.              Dialysis/Infusion    No services have been selected for the patient.              Home Medical Care    No services have been selected for the patient.              Therapy    No services have been selected for the patient.              Community Resources    No services have been selected for the patient.              Community & DME    No services have been selected for the patient.                       Final Discharge Disposition Code: 01 - home or self-care

## 2023-05-16 LAB
LAB AP CASE REPORT: NORMAL
PATH REPORT.FINAL DX SPEC: NORMAL
PATH REPORT.GROSS SPEC: NORMAL

## 2023-07-29 ENCOUNTER — HOSPITAL ENCOUNTER (EMERGENCY)
Facility: HOSPITAL | Age: 73
Discharge: HOME | End: 2023-07-29
Attending: EMERGENCY MEDICINE
Payer: MEDICARE

## 2023-07-29 ENCOUNTER — APPOINTMENT (OUTPATIENT)
Dept: GENERAL RADIOLOGY | Facility: HOSPITAL | Age: 73
End: 2023-07-29
Payer: MEDICARE

## 2023-07-29 VITALS
TEMPERATURE: 97.7 F | WEIGHT: 160 LBS | OXYGEN SATURATION: 99 % | HEART RATE: 72 BPM | SYSTOLIC BLOOD PRESSURE: 145 MMHG | HEIGHT: 62 IN | RESPIRATION RATE: 16 BRPM | DIASTOLIC BLOOD PRESSURE: 81 MMHG | BODY MASS INDEX: 29.44 KG/M2

## 2023-07-29 DIAGNOSIS — R07.9 CHEST PAIN, UNSPECIFIED TYPE: Primary | ICD-10-CM

## 2023-07-29 LAB
ALBUMIN SERPL-MCNC: 4.7 G/DL (ref 3.5–5.2)
ALBUMIN/GLOB SERPL: 1.6 G/DL
ALP SERPL-CCNC: 99 U/L (ref 39–117)
ALT SERPL W P-5'-P-CCNC: 45 U/L (ref 1–33)
ANION GAP SERPL CALCULATED.3IONS-SCNC: 10.3 MMOL/L (ref 5–15)
AST SERPL-CCNC: 92 U/L (ref 1–32)
BASOPHILS # BLD AUTO: 0.03 10*3/MM3 (ref 0–0.2)
BASOPHILS NFR BLD AUTO: 0.5 % (ref 0–1.5)
BILIRUB SERPL-MCNC: 0.7 MG/DL (ref 0–1.2)
BUN SERPL-MCNC: 17 MG/DL (ref 8–23)
BUN/CREAT SERPL: 18.1 (ref 7–25)
CALCIUM SPEC-SCNC: 9.5 MG/DL (ref 8.6–10.5)
CHLORIDE SERPL-SCNC: 99 MMOL/L (ref 98–107)
CO2 SERPL-SCNC: 27.7 MMOL/L (ref 22–29)
CREAT SERPL-MCNC: 0.94 MG/DL (ref 0.57–1)
D DIMER PPP FEU-MCNC: 0.38 MCGFEU/ML (ref 0–0.7)
DEPRECATED RDW RBC AUTO: 46.5 FL (ref 37–54)
EGFRCR SERPLBLD CKD-EPI 2021: 65.4 ML/MIN/1.73
EOSINOPHIL # BLD AUTO: 0.32 10*3/MM3 (ref 0–0.4)
EOSINOPHIL NFR BLD AUTO: 5.1 % (ref 0.3–6.2)
ERYTHROCYTE [DISTWIDTH] IN BLOOD BY AUTOMATED COUNT: 13.5 % (ref 12.3–15.4)
GEN 5 2HR TROPONIN T REFLEX: 9 NG/L
GLOBULIN UR ELPH-MCNC: 3 GM/DL
GLUCOSE SERPL-MCNC: 105 MG/DL (ref 65–99)
HCT VFR BLD AUTO: 40.3 % (ref 34–46.6)
HGB BLD-MCNC: 13.3 G/DL (ref 12–15.9)
HOLD SPECIMEN: NORMAL
HOLD SPECIMEN: NORMAL
IMM GRANULOCYTES # BLD AUTO: 0.01 10*3/MM3 (ref 0–0.05)
IMM GRANULOCYTES NFR BLD AUTO: 0.2 % (ref 0–0.5)
LYMPHOCYTES # BLD AUTO: 2.18 10*3/MM3 (ref 0.7–3.1)
LYMPHOCYTES NFR BLD AUTO: 34.7 % (ref 19.6–45.3)
MCH RBC QN AUTO: 30.8 PG (ref 26.6–33)
MCHC RBC AUTO-ENTMCNC: 33 G/DL (ref 31.5–35.7)
MCV RBC AUTO: 93.3 FL (ref 79–97)
MONOCYTES # BLD AUTO: 0.7 10*3/MM3 (ref 0.1–0.9)
MONOCYTES NFR BLD AUTO: 11.1 % (ref 5–12)
NEUTROPHILS NFR BLD AUTO: 3.04 10*3/MM3 (ref 1.7–7)
NEUTROPHILS NFR BLD AUTO: 48.4 % (ref 42.7–76)
NRBC BLD AUTO-RTO: 0 /100 WBC (ref 0–0.2)
PLATELET # BLD AUTO: 213 10*3/MM3 (ref 140–450)
PMV BLD AUTO: 10.1 FL (ref 6–12)
POTASSIUM SERPL-SCNC: 3.7 MMOL/L (ref 3.5–5.2)
PROT SERPL-MCNC: 7.7 G/DL (ref 6–8.5)
RBC # BLD AUTO: 4.32 10*6/MM3 (ref 3.77–5.28)
SODIUM SERPL-SCNC: 137 MMOL/L (ref 136–145)
TROPONIN T DELTA: -1 NG/L
TROPONIN T SERPL HS-MCNC: 10 NG/L
WBC NRBC COR # BLD: 6.28 10*3/MM3 (ref 3.4–10.8)
WHOLE BLOOD HOLD COAG: NORMAL
WHOLE BLOOD HOLD SPECIMEN: NORMAL

## 2023-07-29 PROCEDURE — 99284 EMERGENCY DEPT VISIT MOD MDM: CPT

## 2023-07-29 PROCEDURE — 84484 ASSAY OF TROPONIN QUANT: CPT

## 2023-07-29 PROCEDURE — 96374 THER/PROPH/DIAG INJ IV PUSH: CPT

## 2023-07-29 PROCEDURE — 85379 FIBRIN DEGRADATION QUANT: CPT | Performed by: EMERGENCY MEDICINE

## 2023-07-29 PROCEDURE — 93005 ELECTROCARDIOGRAM TRACING: CPT

## 2023-07-29 PROCEDURE — 93010 ELECTROCARDIOGRAM REPORT: CPT | Performed by: INTERNAL MEDICINE

## 2023-07-29 PROCEDURE — 80053 COMPREHEN METABOLIC PANEL: CPT

## 2023-07-29 PROCEDURE — 71046 X-RAY EXAM CHEST 2 VIEWS: CPT

## 2023-07-29 PROCEDURE — 25010000002 MORPHINE PER 10 MG: Performed by: EMERGENCY MEDICINE

## 2023-07-29 PROCEDURE — 85025 COMPLETE CBC W/AUTO DIFF WBC: CPT

## 2023-07-29 PROCEDURE — 36415 COLL VENOUS BLD VENIPUNCTURE: CPT

## 2023-07-29 RX ORDER — ALUMINA, MAGNESIA, AND SIMETHICONE 2400; 2400; 240 MG/30ML; MG/30ML; MG/30ML
30 SUSPENSION ORAL ONCE
Status: COMPLETED | OUTPATIENT
Start: 2023-07-29 | End: 2023-07-29

## 2023-07-29 RX ORDER — MELOXICAM 15 MG/1
15 TABLET ORAL DAILY
Qty: 5 TABLET | Refills: 0 | Status: ON HOLD | OUTPATIENT
Start: 2023-07-29 | End: 2023-07-31

## 2023-07-29 RX ORDER — ONDANSETRON 4 MG/1
4 TABLET, FILM COATED ORAL EVERY 8 HOURS PRN
Qty: 21 TABLET | Refills: 0 | Status: ON HOLD | OUTPATIENT
Start: 2023-07-29 | End: 2023-07-31

## 2023-07-29 RX ORDER — OMEPRAZOLE 20 MG/1
20 CAPSULE, DELAYED RELEASE ORAL DAILY
Status: ON HOLD | COMMUNITY
End: 2023-07-31 | Stop reason: DRUGHIGH

## 2023-07-29 RX ORDER — ATORVASTATIN CALCIUM 40 MG/1
40 TABLET, FILM COATED ORAL DAILY
Status: ON HOLD | COMMUNITY
End: 2023-08-01 | Stop reason: SDUPTHER

## 2023-07-29 RX ORDER — SODIUM CHLORIDE 0.9 % (FLUSH) 0.9 %
10 SYRINGE (ML) INJECTION AS NEEDED
Status: DISCONTINUED | OUTPATIENT
Start: 2023-07-29 | End: 2023-07-30 | Stop reason: HOSPADM

## 2023-07-29 RX ADMIN — MORPHINE SULFATE 2 MG: 4 INJECTION, SOLUTION INTRAMUSCULAR; INTRAVENOUS at 23:17

## 2023-07-29 RX ADMIN — ALUMINUM HYDROXIDE, MAGNESIUM HYDROXIDE, AND DIMETHICONE 30 ML: 400; 400; 40 SUSPENSION ORAL at 21:50

## 2023-07-30 ENCOUNTER — APPOINTMENT (OUTPATIENT)
Dept: CT IMAGING | Facility: HOSPITAL | Age: 73
End: 2023-07-30
Payer: MEDICARE

## 2023-07-30 ENCOUNTER — HOSPITAL ENCOUNTER (OUTPATIENT)
Facility: HOSPITAL | Age: 73
Setting detail: OBSERVATION
Discharge: HOME | End: 2023-08-01
Attending: EMERGENCY MEDICINE | Admitting: HOSPITALIST
Payer: MEDICARE

## 2023-07-30 DIAGNOSIS — R10.9 ABDOMINAL PAIN, UNSPECIFIED ABDOMINAL LOCATION: Primary | ICD-10-CM

## 2023-07-30 DIAGNOSIS — R79.89 ELEVATED LIVER FUNCTION TESTS: ICD-10-CM

## 2023-07-30 LAB
ALBUMIN SERPL-MCNC: 4.1 G/DL (ref 3.5–5.2)
ALBUMIN/GLOB SERPL: 1.5 G/DL
ALP SERPL-CCNC: 237 U/L (ref 39–117)
ALT SERPL W P-5'-P-CCNC: 339 U/L (ref 1–33)
AMORPH URATE CRY URNS QL MICRO: ABNORMAL /HPF
ANION GAP SERPL CALCULATED.3IONS-SCNC: 11.6 MMOL/L (ref 5–15)
APTT PPP: 25.6 SECONDS (ref 24.3–38.1)
AST SERPL-CCNC: 423 U/L (ref 1–32)
BACTERIA UR QL AUTO: ABNORMAL /HPF
BASOPHILS # BLD AUTO: 0.03 10*3/MM3 (ref 0–0.2)
BASOPHILS NFR BLD AUTO: 0.5 % (ref 0–1.5)
BILIRUB SERPL-MCNC: 2.4 MG/DL (ref 0–1.2)
BILIRUB UR QL STRIP: ABNORMAL
BUN SERPL-MCNC: 14 MG/DL (ref 8–23)
BUN/CREAT SERPL: 15.6 (ref 7–25)
CALCIUM SPEC-SCNC: 9.7 MG/DL (ref 8.6–10.5)
CHLORIDE SERPL-SCNC: 100 MMOL/L (ref 98–107)
CLARITY UR: ABNORMAL
CO2 SERPL-SCNC: 24.4 MMOL/L (ref 22–29)
COLOR UR: ABNORMAL
CREAT SERPL-MCNC: 0.9 MG/DL (ref 0.57–1)
D-LACTATE SERPL-SCNC: 1 MMOL/L (ref 0.5–2)
DEPRECATED RDW RBC AUTO: 46 FL (ref 37–54)
EGFRCR SERPLBLD CKD-EPI 2021: 68.9 ML/MIN/1.73
EOSINOPHIL # BLD AUTO: 0.15 10*3/MM3 (ref 0–0.4)
EOSINOPHIL NFR BLD AUTO: 2.6 % (ref 0.3–6.2)
ERYTHROCYTE [DISTWIDTH] IN BLOOD BY AUTOMATED COUNT: 13.5 % (ref 12.3–15.4)
GLOBULIN UR ELPH-MCNC: 2.8 GM/DL
GLUCOSE SERPL-MCNC: 102 MG/DL (ref 65–99)
GLUCOSE UR STRIP-MCNC: NEGATIVE MG/DL
HCT VFR BLD AUTO: 39.6 % (ref 34–46.6)
HETEROPH AB SER QL LA: NEGATIVE
HGB BLD-MCNC: 13.3 G/DL (ref 12–15.9)
HGB UR QL STRIP.AUTO: NEGATIVE
HYALINE CASTS UR QL AUTO: ABNORMAL /LPF
IMM GRANULOCYTES # BLD AUTO: 0.01 10*3/MM3 (ref 0–0.05)
IMM GRANULOCYTES NFR BLD AUTO: 0.2 % (ref 0–0.5)
INR PPP: 0.94 (ref 0.9–1.1)
KETONES UR QL STRIP: ABNORMAL
LEUKOCYTE ESTERASE UR QL STRIP.AUTO: ABNORMAL
LIPASE SERPL-CCNC: 17 U/L (ref 13–60)
LYMPHOCYTES # BLD AUTO: 1.28 10*3/MM3 (ref 0.7–3.1)
LYMPHOCYTES NFR BLD AUTO: 22.2 % (ref 19.6–45.3)
MAGNESIUM SERPL-MCNC: 2 MG/DL (ref 1.6–2.4)
MCH RBC QN AUTO: 30.8 PG (ref 26.6–33)
MCHC RBC AUTO-ENTMCNC: 33.6 G/DL (ref 31.5–35.7)
MCV RBC AUTO: 91.7 FL (ref 79–97)
MONOCYTES # BLD AUTO: 0.65 10*3/MM3 (ref 0.1–0.9)
MONOCYTES NFR BLD AUTO: 11.3 % (ref 5–12)
NEUTROPHILS NFR BLD AUTO: 3.65 10*3/MM3 (ref 1.7–7)
NEUTROPHILS NFR BLD AUTO: 63.2 % (ref 42.7–76)
NITRITE UR QL STRIP: NEGATIVE
NRBC BLD AUTO-RTO: 0 /100 WBC (ref 0–0.2)
PH UR STRIP.AUTO: 8.5 [PH] (ref 4.5–8)
PLATELET # BLD AUTO: 202 10*3/MM3 (ref 140–450)
PMV BLD AUTO: 10.6 FL (ref 6–12)
POTASSIUM SERPL-SCNC: 3.5 MMOL/L (ref 3.5–5.2)
PROCALCITONIN SERPL-MCNC: 0.09 NG/ML (ref 0–0.25)
PROT SERPL-MCNC: 6.9 G/DL (ref 6–8.5)
PROT UR QL STRIP: ABNORMAL
PROTHROMBIN TIME: 12.6 SECONDS (ref 12.1–15)
RBC # BLD AUTO: 4.32 10*6/MM3 (ref 3.77–5.28)
RBC # UR STRIP: ABNORMAL /HPF
REF LAB TEST METHOD: ABNORMAL
SODIUM SERPL-SCNC: 136 MMOL/L (ref 136–145)
SP GR UR STRIP: 1.02 (ref 1–1.03)
SQUAMOUS #/AREA URNS HPF: ABNORMAL /HPF
TROPONIN T SERPL HS-MCNC: 11 NG/L
UROBILINOGEN UR QL STRIP: ABNORMAL
WBC # UR STRIP: ABNORMAL /HPF
WBC NRBC COR # BLD: 5.77 10*3/MM3 (ref 3.4–10.8)

## 2023-07-30 PROCEDURE — 96375 TX/PRO/DX INJ NEW DRUG ADDON: CPT

## 2023-07-30 PROCEDURE — 25010000002 ONDANSETRON PER 1 MG: Performed by: EMERGENCY MEDICINE

## 2023-07-30 PROCEDURE — 82977 ASSAY OF GGT: CPT | Performed by: HOSPITALIST

## 2023-07-30 PROCEDURE — 80053 COMPREHEN METABOLIC PANEL: CPT | Performed by: EMERGENCY MEDICINE

## 2023-07-30 PROCEDURE — 86308 HETEROPHILE ANTIBODY SCREEN: CPT | Performed by: EMERGENCY MEDICINE

## 2023-07-30 PROCEDURE — 84145 PROCALCITONIN (PCT): CPT | Performed by: EMERGENCY MEDICINE

## 2023-07-30 PROCEDURE — 85610 PROTHROMBIN TIME: CPT | Performed by: EMERGENCY MEDICINE

## 2023-07-30 PROCEDURE — 93010 ELECTROCARDIOGRAM REPORT: CPT | Performed by: INTERNAL MEDICINE

## 2023-07-30 PROCEDURE — 85730 THROMBOPLASTIN TIME PARTIAL: CPT | Performed by: EMERGENCY MEDICINE

## 2023-07-30 PROCEDURE — 80074 ACUTE HEPATITIS PANEL: CPT | Performed by: EMERGENCY MEDICINE

## 2023-07-30 PROCEDURE — 87150 DNA/RNA AMPLIFIED PROBE: CPT | Performed by: EMERGENCY MEDICINE

## 2023-07-30 PROCEDURE — 83735 ASSAY OF MAGNESIUM: CPT | Performed by: EMERGENCY MEDICINE

## 2023-07-30 PROCEDURE — G0378 HOSPITAL OBSERVATION PER HR: HCPCS

## 2023-07-30 PROCEDURE — 93005 ELECTROCARDIOGRAM TRACING: CPT | Performed by: EMERGENCY MEDICINE

## 2023-07-30 PROCEDURE — 85025 COMPLETE CBC W/AUTO DIFF WBC: CPT | Performed by: EMERGENCY MEDICINE

## 2023-07-30 PROCEDURE — 25510000001 IOPAMIDOL PER 1 ML: Performed by: EMERGENCY MEDICINE

## 2023-07-30 PROCEDURE — 96374 THER/PROPH/DIAG INJ IV PUSH: CPT

## 2023-07-30 PROCEDURE — 83605 ASSAY OF LACTIC ACID: CPT | Performed by: EMERGENCY MEDICINE

## 2023-07-30 PROCEDURE — 81001 URINALYSIS AUTO W/SCOPE: CPT | Performed by: EMERGENCY MEDICINE

## 2023-07-30 PROCEDURE — 96376 TX/PRO/DX INJ SAME DRUG ADON: CPT

## 2023-07-30 PROCEDURE — 87186 SC STD MICRODIL/AGAR DIL: CPT | Performed by: EMERGENCY MEDICINE

## 2023-07-30 PROCEDURE — 83690 ASSAY OF LIPASE: CPT | Performed by: EMERGENCY MEDICINE

## 2023-07-30 PROCEDURE — 84484 ASSAY OF TROPONIN QUANT: CPT | Performed by: EMERGENCY MEDICINE

## 2023-07-30 PROCEDURE — 87040 BLOOD CULTURE FOR BACTERIA: CPT | Performed by: EMERGENCY MEDICINE

## 2023-07-30 PROCEDURE — 74177 CT ABD & PELVIS W/CONTRAST: CPT

## 2023-07-30 PROCEDURE — 99285 EMERGENCY DEPT VISIT HI MDM: CPT

## 2023-07-30 PROCEDURE — 25010000002 MORPHINE PER 10 MG: Performed by: EMERGENCY MEDICINE

## 2023-07-30 PROCEDURE — 36415 COLL VENOUS BLD VENIPUNCTURE: CPT

## 2023-07-30 RX ORDER — ONDANSETRON 2 MG/ML
4 INJECTION INTRAMUSCULAR; INTRAVENOUS ONCE
Status: COMPLETED | OUTPATIENT
Start: 2023-07-30 | End: 2023-07-30

## 2023-07-30 RX ADMIN — MORPHINE SULFATE 4 MG: 4 INJECTION, SOLUTION INTRAMUSCULAR; INTRAVENOUS at 19:11

## 2023-07-30 RX ADMIN — IOPAMIDOL 100 ML: 755 INJECTION, SOLUTION INTRAVENOUS at 21:39

## 2023-07-30 RX ADMIN — MORPHINE SULFATE 4 MG: 4 INJECTION, SOLUTION INTRAMUSCULAR; INTRAVENOUS at 19:45

## 2023-07-30 RX ADMIN — SODIUM CHLORIDE, POTASSIUM CHLORIDE, SODIUM LACTATE AND CALCIUM CHLORIDE 500 ML: 600; 310; 30; 20 INJECTION, SOLUTION INTRAVENOUS at 22:47

## 2023-07-30 RX ADMIN — SODIUM CHLORIDE, POTASSIUM CHLORIDE, SODIUM LACTATE AND CALCIUM CHLORIDE 500 ML: 600; 310; 30; 20 INJECTION, SOLUTION INTRAVENOUS at 19:33

## 2023-07-30 RX ADMIN — ONDANSETRON 4 MG: 2 INJECTION INTRAMUSCULAR; INTRAVENOUS at 19:12

## 2023-07-30 NOTE — ED PROVIDER NOTES
Subjective   History of Present Illness  72-year-old female past medical history significant for hypertension hyperlipidemia and reflux presents emergency room complaining of mid epigastric pain that she is described as sharp occurring for approximate 40 minutes around 530 this evening.  This started approximate 30 minutes after patient ate dinner.  Patient states she knows of nothing that made it better or worse.  Patient states that when she has reflux it is only burning in her throat in the morning when she wakes up and she is never experienced pain like this before.    Review of Systems   Constitutional:  Negative for activity change, appetite change, chills, diaphoresis, fatigue and fever.   HENT:  Negative for congestion, rhinorrhea and sore throat.    Eyes:  Negative for photophobia and visual disturbance.   Respiratory:  Negative for cough and shortness of breath.    Cardiovascular:  Positive for chest pain. Negative for palpitations and leg swelling.   Gastrointestinal:  Negative for abdominal distention, abdominal pain, diarrhea, nausea and vomiting.   Genitourinary:  Negative for dysuria and flank pain.   Musculoskeletal:  Negative for arthralgias and back pain.   Skin:  Negative for rash.   Neurological:  Negative for dizziness, weakness and headaches.   Psychiatric/Behavioral:  Negative for agitation and behavioral problems.      Past Medical History:   Diagnosis Date    H/O colonoscopy     Hyperlipemia     Hypertension     Renal calculi        Allergies   Allergen Reactions    Norco [Hydrocodone-Acetaminophen] Hallucinations       Past Surgical History:   Procedure Laterality Date    BACK SURGERY N/A     CHOLECYSTECTOMY WITH INTRAOPERATIVE CHOLANGIOGRAM N/A 5/13/2023    Procedure: CHOLECYSTECTOMY LAPAROSCOPIC INTRAOPERATIVE CHOLANGIOGRAM;  Surgeon: Desi De Dios MD;  Location: [facility];  Service: General;  Laterality: N/A;    MOUTH SURGERY      TOOTH EXTRACTION       Family History   Problem  Relation Age of Onset    Other Mother         MALIGNANT NEOPLASM     Cancer Mother     Hypertension Father     Other Father         MALIGNANT NEOPLASM     Cancer Father     No Known Problems Maternal Grandmother     No Known Problems Maternal Grandfather     Other Paternal Grandmother         MALIGNANT NEOPLASM     No Known Problems Paternal Grandfather        Social History     Socioeconomic History    Marital status:    Tobacco Use    Smoking status: Never    Smokeless tobacco: Never   Vaping Use    Vaping Use: Never used   Substance and Sexual Activity    Alcohol use: No    Drug use: No    Sexual activity: Yes     Partners: Male     Birth control/protection: Surgical           Objective   Physical Exam  Vitals and nursing note reviewed.   Constitutional:       General: She is not in acute distress.     Appearance: Normal appearance. She is not ill-appearing, toxic-appearing or diaphoretic.   HENT:      Head: Normocephalic and atraumatic.      Nose: Nose normal.      Mouth/Throat:      Mouth: Mucous membranes are moist.   Eyes:      Conjunctiva/sclera: Conjunctivae normal.   Cardiovascular:      Rate and Rhythm: Normal rate and regular rhythm.      Pulses: Normal pulses.      Heart sounds: Normal heart sounds.   Pulmonary:      Effort: Pulmonary effort is normal.      Breath sounds: Normal breath sounds.   Abdominal:      General: Abdomen is flat. There is no distension.      Tenderness: There is no abdominal tenderness.   Musculoskeletal:         General: No swelling. Normal range of motion.      Cervical back: Normal range of motion and neck supple.   Skin:     General: Skin is warm and dry.   Neurological:      General: No focal deficit present.      Mental Status: She is alert and oriented to person, place, and time.   Psychiatric:         Mood and Affect: Mood normal.       Procedures           ED Course  ED Course as of 07/29/23 2334   Sat Jul 29, 2023 2129 EKG time 1930  Normal sinus rhythm  Heart  rate 75  Axes are normal  Intervals are normal  No acute ST abnormalities noted [BH]   2254 Return repeat high-sensitivity troponin is 9 with a delta of -1 [BH]   2309 Chest x-ray is unremarkable [BH]   2309 CBC is unremarkable.  CMP is unremarkable with the exception of ALT and AST are mildly elevated [BH]   2310 Patient states the Maalox helped somewhat but she still is having pain at times.  We will assess with a D-dimer give patient stronger pain medicine at the present time []   2331 D-Dimer, Quant: 0.38 [BH]   2333 D-dimer is within normal limits.  Patient is ruled out for PE or MI.  Patient encouraged to follow-up with primary care physician []   2333  as needed and/or can return emergency room for new persistent or worsening symptoms. []      ED Course User Index  [] Abner Lopez MD                      HEART Score: 3                      Medical Decision Making  My differential diagnosis for chest pain includes but is not limited to:  Muscle strain, costochondritis, myositis, pleurisy, rib fracture, intercostal neuritis, herpes zoster, tumor, myocardial infarction, coronary syndrome, unstable angina, angina, aortic dissection, mitral valve prolapse, pericarditis, palpitations, pulmonary embolus, pneumonia, pneumothorax, lung cancer, GERD, esophagitis, esophageal spasm     Problems Addressed:  Chest pain, unspecified type: complicated acute illness or injury    Amount and/or Complexity of Data Reviewed  Labs:  Decision-making details documented in ED Course.    Risk  OTC drugs.  Prescription drug management.        Final diagnoses:   Chest pain, unspecified type       ED Disposition  ED Disposition       ED Disposition   Discharge    Condition   Stable    Comment   --               Korey Wylie MD  [address] [facility] [id number] [phone]    Schedule an appointment as soon as possible for a visit       [facility] EMERGENCY DEPARTMENT  [address]  [place] [address] [phone]  Go to   As needed         Medication List        New Prescriptions      meloxicam 15 MG tablet  Commonly known as: MOBIC  Take 1 tablet by mouth Daily.     ondansetron 4 MG tablet  Commonly known as: ZOFRAN  Take 1 tablet by mouth Every 8 (Eight) Hours As Needed for Nausea or Vomiting for up to 7 days.               Where to Get Your Medications        These medications were sent to [id number] - [place] [facility] & [address] - [phone]  - [phone] [place] [address], [place] [address]      Phone: [phone]   meloxicam 15 MG tablet  ondansetron 4 MG tablet            Abner Lopez MD  07/29/23 4254

## 2023-07-30 NOTE — ED PROVIDER NOTES
Subjective   History of Present Illness  72-year-old female past medical history significant for hypertension hyperlipidemia kidney stones and cholecystectomy 2 months ago who was seen last night for similar presentation of midsternal chest pain he was discharged home after work-up revealed no diagnostic etiology and patient was feeling better patient states she went to sleep last night and was feeling much better sleeping through the morning and she got out of bed approximately noon this morning and had a popsicle as well as some water with onset of the same pain but in her epigastric area soon after the eating the popsicle and drinking the water.  She states that she had the symptoms for the rest of the afternoon and slowly worsening until she came to emergency room again this evening for evaluation.  Patient denies chest pain shortness of breath diaphoresis neck pain jaw pain vomiting or diarrhea but does describe some nausea and anorexia now.    Review of Systems   Constitutional:  Positive for appetite change. Negative for activity change, chills, diaphoresis, fatigue and fever.   HENT:  Negative for congestion, rhinorrhea and sore throat.    Eyes:  Negative for photophobia and visual disturbance.   Respiratory:  Negative for cough and shortness of breath.    Cardiovascular:  Negative for chest pain, palpitations and leg swelling.   Gastrointestinal:  Positive for abdominal pain and nausea. Negative for abdominal distention, diarrhea and vomiting.   Genitourinary:  Negative for dysuria and flank pain.   Musculoskeletal:  Negative for arthralgias and back pain.   Skin:  Negative for rash.   Neurological:  Negative for dizziness, weakness and headaches.   Psychiatric/Behavioral:  Negative for agitation and behavioral problems.      Past Medical History:   Diagnosis Date    H/O colonoscopy     Hyperlipemia     Hypertension     Renal calculi        Allergies   Allergen Reactions    Norco [Hydrocodone-Acetaminophen]  Hallucinations       Past Surgical History:   Procedure Laterality Date    BACK SURGERY N/A     CHOLECYSTECTOMY WITH INTRAOPERATIVE CHOLANGIOGRAM N/A 5/13/2023    Procedure: CHOLECYSTECTOMY LAPAROSCOPIC INTRAOPERATIVE CHOLANGIOGRAM;  Surgeon: Desi De Dios MD;  Location: [facility];  Service: General;  Laterality: N/A;    MOUTH SURGERY      TOOTH EXTRACTION       Family History   Problem Relation Age of Onset    Other Mother         MALIGNANT NEOPLASM     Cancer Mother     Hypertension Father     Other Father         MALIGNANT NEOPLASM     Cancer Father     No Known Problems Maternal Grandmother     No Known Problems Maternal Grandfather     Other Paternal Grandmother         MALIGNANT NEOPLASM     No Known Problems Paternal Grandfather        Social History     Socioeconomic History    Marital status:    Tobacco Use    Smoking status: Never    Smokeless tobacco: Never   Vaping Use    Vaping Use: Never used   Substance and Sexual Activity    Alcohol use: No    Drug use: No    Sexual activity: Yes     Partners: Male     Birth control/protection: Surgical           Objective   Physical Exam  Vitals and nursing note reviewed.   Constitutional:       General: She is not in acute distress.     Appearance: Normal appearance. She is not ill-appearing, toxic-appearing or diaphoretic.      Comments: Pleasant 72-year-old female who is in no apparent distress   HENT:      Head: Normocephalic and atraumatic.      Nose: Nose normal.      Mouth/Throat:      Mouth: Mucous membranes are moist.   Eyes:      Conjunctiva/sclera: Conjunctivae normal.   Cardiovascular:      Rate and Rhythm: Normal rate and regular rhythm.      Pulses: Normal pulses.   Pulmonary:      Effort: Pulmonary effort is normal.      Breath sounds: Normal breath sounds.   Abdominal:      General: Abdomen is flat. There is no distension.      Tenderness: There is abdominal tenderness in the epigastric area. There is right CVA tenderness and left CVA  tenderness. There is no guarding or rebound.      Hernia: No hernia is present.   Musculoskeletal:         General: No swelling. Normal range of motion.      Cervical back: Normal range of motion and neck supple.   Skin:     General: Skin is warm and dry.   Neurological:      General: No focal deficit present.      Mental Status: She is alert and oriented to person, place, and time.   Psychiatric:         Mood and Affect: Mood normal.       Procedures           ED Course  ED Course as of 07/30/23 2332   Sun Jul 30, 2023 2237 Patient's repeat lab work this evening shows marked elevation in her liver function tests when compared to yesterday.  Otherwise lab work is essentially unremarkable with exception of the urinalysis shows some dehydration.  CT scan shows some mild edema around her periportal area otherwise noncontributory. []   2238 Spoke with hospitalist Dr. Tracey who agrees for observation for possible hepatitis and intractable pain. []   2242 Patient states she has been on Lipitor for years with no change in dosing recently. []      ED Course User Index  [] Abner Lopez MD                      HEART Score: 3                      Medical Decision Making  My differential diagnosis for abdominal pain includes but is not limited to:  Gastritis, gastroenteritis, peptic ulcer disease, GERD, esophageal perforation, acute appendicitis, mesenteric adenitis, Meckel's diverticulum, epiploic appendagitis, diverticulitis, colon cancer, ulcerative colitis, Crohn's disease, intussusception, small bowel obstruction, adhesions, ischemic bowel, perforated viscus, ileus, obstipation, biliary colic, cholecystitis, cholelithiasis, Simón-Elier Jose, hepatitis, pancreatitis, common bile duct obstruction, cholangitis, bile leak, splenic trauma, splenic rupture, splenic infarction, splenic abscess, abdominal wall hematoma, abdominal wall abscess, intra-abdominal abscess, ascites, spontaneous bacterial peritonitis,  hernia, UTI, cystitis, prostatitis, ureterolithiasis, urinary obstruction, AAA, myocardial infarction, pneumonia, cancer, porphyria, DKA, medications, sickle cell, viral syndrome, zoster     Problems Addressed:  Abdominal pain, unspecified abdominal location: complicated acute illness or injury  Elevated liver function tests: complicated acute illness or injury    Amount and/or Complexity of Data Reviewed  Labs: ordered. Decision-making details documented in ED Course.  Radiology: ordered. Decision-making details documented in ED Course.  ECG/medicine tests: ordered.    Risk  Prescription drug management.  Decision regarding hospitalization.        Final diagnoses:   Abdominal pain, unspecified abdominal location   Elevated liver function tests       ED Disposition  ED Disposition       ED Disposition   Decision to Admit    Condition   --    Comment   Level of Care: Med/Surg [1]   Diagnosis: Elevated liver function tests [279511]   Admitting Physician: ANA PAULA ALCANTARA [[id number]]   Attending Physician: BLOSSOM ALEMAN [[id number]]                 No follow-up provider specified.       Medication List      No changes were made to your prescriptions during this visit.            Blossom Aleman MD  07/30/23 5884

## 2023-07-31 ENCOUNTER — APPOINTMENT (OUTPATIENT)
Dept: ULTRASOUND IMAGING | Facility: HOSPITAL | Age: 73
End: 2023-07-31
Payer: MEDICARE

## 2023-07-31 ENCOUNTER — APPOINTMENT (OUTPATIENT)
Dept: MRI IMAGING | Facility: HOSPITAL | Age: 73
End: 2023-07-31
Payer: MEDICARE

## 2023-07-31 PROBLEM — R74.01 TRANSAMINITIS: Status: ACTIVE | Noted: 2023-07-31

## 2023-07-31 LAB
ALBUMIN SERPL-MCNC: 3.3 G/DL (ref 3.5–5.2)
ALBUMIN/GLOB SERPL: 1.4 G/DL
ALP SERPL-CCNC: 226 U/L (ref 39–117)
ALT SERPL W P-5'-P-CCNC: 311 U/L (ref 1–33)
ANION GAP SERPL CALCULATED.3IONS-SCNC: 10.4 MMOL/L (ref 5–15)
AST SERPL-CCNC: 295 U/L (ref 1–32)
BILIRUB SERPL-MCNC: 3.1 MG/DL (ref 0–1.2)
BUN SERPL-MCNC: 12 MG/DL (ref 8–23)
BUN/CREAT SERPL: 14.6 (ref 7–25)
CALCIUM SPEC-SCNC: 8.7 MG/DL (ref 8.6–10.5)
CERULOPLASMIN SERPL-MCNC: 23 MG/DL (ref 19–39)
CHLORIDE SERPL-SCNC: 99 MMOL/L (ref 98–107)
CO2 SERPL-SCNC: 24.6 MMOL/L (ref 22–29)
CREAT SERPL-MCNC: 0.82 MG/DL (ref 0.57–1)
EGFRCR SERPLBLD CKD-EPI 2021: 77.1 ML/MIN/1.73
FERRITIN SERPL-MCNC: 440 NG/ML (ref 13–150)
GEN 5 2HR TROPONIN T REFLEX: 15 NG/L
GGT SERPL-CCNC: 419 U/L (ref 5–36)
GLOBULIN UR ELPH-MCNC: 2.4 GM/DL
GLUCOSE SERPL-MCNC: 90 MG/DL (ref 65–99)
HAV IGM SERPL QL IA: NORMAL
HBV CORE IGM SERPL QL IA: NORMAL
HBV SURFACE AG SERPL QL IA: NORMAL
HCV AB SER DONR QL: NORMAL
IGA1 MFR SER: 277 MG/DL (ref 70–400)
IGG1 SER-MCNC: 688 MG/DL (ref 700–1600)
IGM SERPL-MCNC: 54 MG/DL (ref 40–230)
LDH SERPL-CCNC: 259 U/L (ref 135–214)
LIPASE SERPL-CCNC: 13 U/L (ref 13–60)
POTASSIUM SERPL-SCNC: 4 MMOL/L (ref 3.5–5.2)
PROCALCITONIN SERPL-MCNC: 0.65 NG/ML (ref 0–0.25)
PROT SERPL-MCNC: 5.7 G/DL (ref 6–8.5)
QT INTERVAL: 357 MS
QT INTERVAL: 398 MS
SODIUM SERPL-SCNC: 134 MMOL/L (ref 136–145)
TROPONIN T DELTA: 3 NG/L
TROPONIN T SERPL HS-MCNC: 12 NG/L

## 2023-07-31 PROCEDURE — 86235 NUCLEAR ANTIGEN ANTIBODY: CPT | Performed by: HOSPITALIST

## 2023-07-31 PROCEDURE — 25010000002 SODIUM CHLORIDE 0.9 % WITH KCL 20 MEQ 20-0.9 MEQ/L-% SOLUTION: Performed by: HOSPITALIST

## 2023-07-31 PROCEDURE — 83690 ASSAY OF LIPASE: CPT | Performed by: HOSPITALIST

## 2023-07-31 PROCEDURE — 86015 ACTIN ANTIBODY EACH: CPT | Performed by: STUDENT IN AN ORGANIZED HEALTH CARE EDUCATION/TRAINING PROGRAM

## 2023-07-31 PROCEDURE — 86364 TISS TRNSGLTMNASE EA IG CLAS: CPT | Performed by: STUDENT IN AN ORGANIZED HEALTH CARE EDUCATION/TRAINING PROGRAM

## 2023-07-31 PROCEDURE — 86644 CMV ANTIBODY: CPT | Performed by: HOSPITALIST

## 2023-07-31 PROCEDURE — 86231 EMA EACH IG CLASS: CPT | Performed by: STUDENT IN AN ORGANIZED HEALTH CARE EDUCATION/TRAINING PROGRAM

## 2023-07-31 PROCEDURE — 86665 EPSTEIN-BARR CAPSID VCA: CPT | Performed by: HOSPITALIST

## 2023-07-31 PROCEDURE — 76705 ECHO EXAM OF ABDOMEN: CPT

## 2023-07-31 PROCEDURE — 0 GADOBENATE DIMEGLUMINE 529 MG/ML SOLUTION: Performed by: HOSPITALIST

## 2023-07-31 PROCEDURE — 86258 DGP ANTIBODY EACH IG CLASS: CPT | Performed by: STUDENT IN AN ORGANIZED HEALTH CARE EDUCATION/TRAINING PROGRAM

## 2023-07-31 PROCEDURE — 86645 CMV ANTIBODY IGM: CPT | Performed by: HOSPITALIST

## 2023-07-31 PROCEDURE — 86225 DNA ANTIBODY NATIVE: CPT | Performed by: HOSPITALIST

## 2023-07-31 PROCEDURE — 96361 HYDRATE IV INFUSION ADD-ON: CPT

## 2023-07-31 PROCEDURE — 99204 OFFICE O/P NEW MOD 45 MIN: CPT | Performed by: INTERNAL MEDICINE

## 2023-07-31 PROCEDURE — 80053 COMPREHEN METABOLIC PANEL: CPT | Performed by: HOSPITALIST

## 2023-07-31 PROCEDURE — 82390 ASSAY OF CERULOPLASMIN: CPT | Performed by: STUDENT IN AN ORGANIZED HEALTH CARE EDUCATION/TRAINING PROGRAM

## 2023-07-31 PROCEDURE — 84484 ASSAY OF TROPONIN QUANT: CPT | Performed by: HOSPITALIST

## 2023-07-31 PROCEDURE — G0378 HOSPITAL OBSERVATION PER HR: HCPCS

## 2023-07-31 PROCEDURE — 74183 MRI ABD W/O CNTR FLWD CNTR: CPT

## 2023-07-31 PROCEDURE — 82728 ASSAY OF FERRITIN: CPT | Performed by: STUDENT IN AN ORGANIZED HEALTH CARE EDUCATION/TRAINING PROGRAM

## 2023-07-31 PROCEDURE — A9577 INJ MULTIHANCE: HCPCS | Performed by: HOSPITALIST

## 2023-07-31 PROCEDURE — 82784 ASSAY IGA/IGD/IGG/IGM EACH: CPT | Performed by: STUDENT IN AN ORGANIZED HEALTH CARE EDUCATION/TRAINING PROGRAM

## 2023-07-31 PROCEDURE — 84145 PROCALCITONIN (PCT): CPT | Performed by: HOSPITALIST

## 2023-07-31 PROCEDURE — 86664 EPSTEIN-BARR NUCLEAR ANTIGEN: CPT | Performed by: HOSPITALIST

## 2023-07-31 PROCEDURE — 83615 LACTATE (LD) (LDH) ENZYME: CPT | Performed by: STUDENT IN AN ORGANIZED HEALTH CARE EDUCATION/TRAINING PROGRAM

## 2023-07-31 PROCEDURE — 86381 MITOCHONDRIAL ANTIBODY EACH: CPT | Performed by: STUDENT IN AN ORGANIZED HEALTH CARE EDUCATION/TRAINING PROGRAM

## 2023-07-31 RX ORDER — HYDROCHLOROTHIAZIDE 12.5 MG/1
12.5 TABLET ORAL DAILY
Status: DISCONTINUED | OUTPATIENT
Start: 2023-07-31 | End: 2023-07-31

## 2023-07-31 RX ORDER — OMEPRAZOLE 40 MG/1
1 CAPSULE, DELAYED RELEASE ORAL DAILY
COMMUNITY
Start: 2023-07-01

## 2023-07-31 RX ORDER — FLUTICASONE PROPIONATE 50 MCG
2 SPRAY, SUSPENSION (ML) NASAL 2 TIMES DAILY PRN
Status: DISCONTINUED | OUTPATIENT
Start: 2023-07-31 | End: 2023-08-01 | Stop reason: HOSPADM

## 2023-07-31 RX ORDER — CETIRIZINE HYDROCHLORIDE 10 MG/1
1 TABLET ORAL DAILY
COMMUNITY
Start: 2023-07-01

## 2023-07-31 RX ORDER — IBUPROFEN 400 MG/1
400 TABLET ORAL EVERY 4 HOURS PRN
Status: DISCONTINUED | OUTPATIENT
Start: 2023-07-31 | End: 2023-08-01 | Stop reason: HOSPADM

## 2023-07-31 RX ORDER — LOSARTAN POTASSIUM 50 MG/1
100 TABLET ORAL
Status: DISCONTINUED | OUTPATIENT
Start: 2023-07-31 | End: 2023-08-01 | Stop reason: HOSPADM

## 2023-07-31 RX ORDER — SODIUM CHLORIDE AND POTASSIUM CHLORIDE 150; 900 MG/100ML; MG/100ML
125 INJECTION, SOLUTION INTRAVENOUS CONTINUOUS
Status: DISCONTINUED | OUTPATIENT
Start: 2023-07-31 | End: 2023-08-01 | Stop reason: HOSPADM

## 2023-07-31 RX ORDER — CETIRIZINE HYDROCHLORIDE 10 MG/1
10 TABLET ORAL DAILY
Status: DISCONTINUED | OUTPATIENT
Start: 2023-07-31 | End: 2023-08-01 | Stop reason: HOSPADM

## 2023-07-31 RX ORDER — ERGOCALCIFEROL 1.25 MG/1
50000 CAPSULE ORAL
COMMUNITY
Start: 2023-02-03

## 2023-07-31 RX ORDER — PANTOPRAZOLE SODIUM 40 MG/1
40 TABLET, DELAYED RELEASE ORAL
Status: DISCONTINUED | OUTPATIENT
Start: 2023-07-31 | End: 2023-08-01 | Stop reason: HOSPADM

## 2023-07-31 RX ORDER — MORPHINE SULFATE 2 MG/ML
2 INJECTION, SOLUTION INTRAMUSCULAR; INTRAVENOUS
Status: DISCONTINUED | OUTPATIENT
Start: 2023-07-31 | End: 2023-08-01 | Stop reason: HOSPADM

## 2023-07-31 RX ORDER — CHOLECALCIFEROL (VITAMIN D3) 125 MCG
10 CAPSULE ORAL NIGHTLY PRN
Status: DISCONTINUED | OUTPATIENT
Start: 2023-07-31 | End: 2023-08-01 | Stop reason: HOSPADM

## 2023-07-31 RX ADMIN — POTASSIUM CHLORIDE AND SODIUM CHLORIDE 125 ML/HR: 900; 150 INJECTION, SOLUTION INTRAVENOUS at 20:49

## 2023-07-31 RX ADMIN — GADOBENATE DIMEGLUMINE 15 ML: 529 INJECTION, SOLUTION INTRAVENOUS at 15:38

## 2023-07-31 RX ADMIN — PANTOPRAZOLE SODIUM 40 MG: 40 TABLET, DELAYED RELEASE ORAL at 06:06

## 2023-07-31 RX ADMIN — LOSARTAN POTASSIUM 100 MG: 50 TABLET, FILM COATED ORAL at 09:18

## 2023-07-31 RX ADMIN — POTASSIUM CHLORIDE AND SODIUM CHLORIDE 125 ML/HR: 900; 150 INJECTION, SOLUTION INTRAVENOUS at 01:36

## 2023-07-31 RX ADMIN — Medication 10 MG: at 21:23

## 2023-07-31 RX ADMIN — CETIRIZINE HYDROCHLORIDE 10 MG: 10 TABLET, FILM COATED ORAL at 20:50

## 2023-07-31 NOTE — ED NOTES
Nursing report ED to floor  Rochelle Montejo  72 y.o.  female    HPI :   Chief Complaint   Patient presents with    Abdominal Pain     Pt c/o upper ABD pain beginning at 12:30 this afternoon       Admitting doctor:   Ludwig Tracey MD    Admitting diagnosis:   The primary encounter diagnosis was Abdominal pain, unspecified abdominal location. A diagnosis of Elevated liver function tests was also pertinent to this visit.    Code status:   Current Code Status       Date Active Code Status Order ID Comments User Context       Prior            Allergies:   Norco [hydrocodone-acetaminophen]    Isolation:   No active isolations    Intake and Output    Intake/Output Summary (Last 24 hours) at 7/30/2023 2346  Last data filed at 7/30/2023 2101  Gross per 24 hour   Intake 500 ml   Output --   Net 500 ml       Weight:       07/30/23  1832   Weight: 72.6 kg (160 lb)       Most recent vitals:   Vitals:    07/30/23 1945 07/30/23 2000 07/30/23 2100 07/30/23 2245   BP:  164/80 153/71 153/71   BP Location:  Right arm Right arm Right arm   Patient Position:  Lying Lying Lying   Pulse: 79 88 94 91   Resp:  18 17 17   Temp:       TempSrc:       SpO2: 98% 98% 93% 93%   Weight:       Height:           Active LDAs/IV Access:   Lines, Drains & Airways       Active LDAs       Name Placement date Placement time Site Days    Peripheral IV 07/30/23 1900 Left Antecubital 07/30/23 1900  Antecubital  less than 1                    Labs (abnormal labs have a star):   Labs Reviewed   COMPREHENSIVE METABOLIC PANEL - Abnormal; Notable for the following components:       Result Value    Glucose 102 (*)     ALT (SGPT) 339 (*)     AST (SGOT) 423 (*)     Alkaline Phosphatase 237 (*)     Total Bilirubin 2.4 (*)     All other components within normal limits    Narrative:     GFR Normal >60  Chronic Kidney Disease <60  Kidney Failure <15     URINALYSIS W/ MICROSCOPIC IF INDICATED (NO CULTURE) - Abnormal; Notable for the following components:    Color, UA  "Dark Yellow (*)     Appearance, UA Cloudy (*)     pH, UA 8.5 (*)     Ketones, UA 40 mg/dL (2+) (*)     Bilirubin, UA Small (1+) (*)     Protein, UA 30 mg/dL (1+) (*)     Leuk Esterase, UA Trace (*)     Urobilinogen, UA 2.0 E.U./dL (*)     All other components within normal limits   SINGLE HSTROPONIN T - Abnormal; Notable for the following components:    HS Troponin T 11 (*)     All other components within normal limits    Narrative:     High Sensitive Troponin T Reference Range:  <10.0 ng/L- Negative Female for AMI  <15.0 ng/L- Negative Male for AMI  >=10 - Abnormal Female indicating possible myocardial injury.  >=15 - Abnormal Male indicating possible myocardial injury.   Clinicians would have to utilize clinical acumen, EKG, Troponin, and serial changes to determine if it is an Acute Myocardial Infarction or myocardial injury due to an underlying chronic condition.        URINALYSIS, MICROSCOPIC ONLY - Abnormal; Notable for the following components:    RBC, UA 0-2 (*)     WBC, UA 3-5 (*)     Bacteria, UA 1+ (*)     Squamous Epithelial Cells, UA 3-6 (*)     All other components within normal limits   LIPASE - Normal   LACTIC ACID, PLASMA - Normal   PROCALCITONIN - Normal    Narrative:     As a Marker for Sepsis (Non-Neonates):    1. <0.5 ng/mL represents a low risk of severe sepsis and/or septic shock.  2. >2 ng/mL represents a high risk of severe sepsis and/or septic shock.    As a Marker for Lower Respiratory Tract Infections that require antibiotic therapy:    PCT on Admission    Antibiotic Therapy       6-12 Hrs later    >0.5                Strongly Recommended  >0.25 - <0.5        Recommended   0.1 - 0.25          Discouraged              Remeasure/reassess PCT  <0.1                Strongly Discouraged     Remeasure/reassess PCT    As 28 day mortality risk marker: \"Change in Procalcitonin Result\" (>80% or <=80%) if Day 0 (or Day 1) and Day 4 values are available. Refer to " [email or web address]-[email or web address]    Change in PCT <=80%  A decrease of PCT levels below or equal to 80% defines a positive change in PCT test result representing a higher risk for 28-day all-cause mortality of patients diagnosed with severe sepsis for septic shock.    Change in PCT >80%  A decrease of PCT levels of more than 80% defines a negative change in PCT result representing a lower risk for 28-day all-cause mortality of patients diagnosed with severe sepsis or septic shock.      MAGNESIUM - Normal   CBC WITH AUTO DIFFERENTIAL - Normal   MONONUCLEOSIS SCREEN - Normal   PROTIME-INR - Normal    Narrative:     Therapeutic Ranges for INR: 2.0-3.0 (PT 20-30)                              2.5-3.5 (PT 25-34)   APTT - Normal    Narrative:     PTT = The equivalent PTT values for the therapeutic range of heparin levels at 0.1 to 0.7 U/ml are 53 to 110 seconds.     BLOOD CULTURE   BLOOD CULTURE   HEPATITIS PANEL, ACUTE   CBC AND DIFFERENTIAL    Narrative:     The following orders were created for panel order CBC & Differential.  Procedure                               Abnormality         Status                     ---------                               -----------         ------                     CBC Auto Differential[[id number]]        Normal              Final result                 Please view results for these tests on the individual orders.       EKG:   ECG 12 Lead Other; epigastric pain   Preliminary Result   HEART RATE= 85  bpm   RR Interval= 704  ms   CT Interval= 161  ms   P Horizontal Axis= -8  deg   P Front Axis= 39  deg   QRSD Interval= 81  ms   QT Interval= 357  ms   QRS Axis= 5  deg   T Wave Axis= 17  deg   - NORMAL ECG -   Sinus rhythm   Electronically Signed By:    Date and Time of Study: 2023-07-30 20:03:40          Meds given in ED:   Medications   lactated ringers bolus 500 mL (0 mL Intravenous Stopped 7/30/23 2101)   ondansetron (ZOFRAN) injection 4 mg (4 mg Intravenous Given 7/30/23 1912)    morphine injection 4 mg (4 mg Intravenous Given 7/30/23 1911)   morphine injection 4 mg (4 mg Intravenous Given 7/30/23 1945)   iopamidol (ISOVUE-370) 76 % injection 100 mL (100 mL Intravenous Given 7/30/23 2139)   lactated ringers bolus 500 mL (500 mL Intravenous New Bag 7/30/23 2247)       Imaging results:  XR Chest 2 View    Result Date: 7/29/2023  No active disease.  This report was finalized on 7/29/2023 7:49 PM by Dr. Roya Mcintosh MD.      CT Abdomen Pelvis With Contrast    Result Date: 7/30/2023  1. Post cholecystectomy. No evidence for fluid collection in the postoperative bed. There is mild periportal edema that is new from the prior study. Please correlate for clinical evidence of hepatitis or liver dysfunction. There may be mild intrahepatic biliary ductal dilatation but the common bile duct doesn't appear to be dilated. 2. Occasional sigmoid diverticula without evidence of diverticulitis. No bowel obstruction or free air. 3. Postoperative changes to the spine   This report was finalized on 7/30/2023 9:55 PM by Dr. Roya Mcintosh MD.       Ambulatory status:   - @negin    Social issues:   Social History     Socioeconomic History    Marital status:    Tobacco Use    Smoking status: Never    Smokeless tobacco: Never   Vaping Use    Vaping Use: Never used   Substance and Sexual Activity    Alcohol use: No    Drug use: No    Sexual activity: Yes     Partners: Male     Birth control/protection: Surgical       NIH Stroke Scale:         Madhuri Gonzalez RN  07/30/23 23:46 EDT

## 2023-07-31 NOTE — CASE MANAGEMENT/SOCIAL WORK
Continued Stay Note  MARLEN Power     Patient Name: Rochelle Montejo  MRN: [id number]  Today's Date: 7/31/2023    Admit Date: 7/30/2023    Plan: plan home with wife   Discharge Plan       Row Name 07/31/23 1658       Plan    Plan plan home with wife    Patient/Family in Agreement with Plan yes    Plan Comments Spoke with pateints  at bedside, patient is off unit for MRI. Face sheet verified. Patient lives in a home with her  and is independent of ADLs including driving. She has access to a cane and rw, but does not typically need to use any DME.She has used HH in the past, but  he does not recall the agency. Patient has a living will and sees Dr Jonathan Wylie as PCP. She uses [facility] pharmacy and there are no issues obtaining medications.  No needs noted at this time, CM # placed on white board. Will follow for dc needs.                   Discharge Codes    No documentation.                       Feng Lott RN

## 2023-07-31 NOTE — PLAN OF CARE
Goal Outcome Evaluation: Patient and Spouse          Pt had MRCP done this afternoon, waiting for results. Pt has no c/o of pain or n/v.  in room. On clear liquid diet. Ambulating ab negin and on RA.

## 2023-07-31 NOTE — PLAN OF CARE
Goal Outcome Evaluation:  Plan of Care Reviewed With: patient        Progress: declining  Outcome Evaluation: Patient temperature 100.8 this morning, HS troponin increasing with delta less than 4, procal and serum bili increasing, LFT decreasing, no complaints of pain, nausea or vomiting.

## 2023-07-31 NOTE — CONSULTS
Patient Care Team:  Korey Wylie MD as PCP - General (Family Medicine)    CHIEF COMPLAINT:  Abdominal Pain and LFTs    HISTORY OF PRESENT ILLNESS:  71 yo with two bouts fo Abdominal pain over the weekend and initial labs were normal but her second presentation with pain similar to her recent(5/2023) GB attack but more epigastric, that has resolved today but lssted hours each time over the wknd.  Reviewedher IOC which appears normal ( no filling defects and not dilated)- Her CT was reviewed as well and no visualized CBD stones nor dilation of her CBD.     She has seen [facility] in the past for her normal screening colons about 2 years ago and her GERD for which she says she was dilated in the past and has done well on a daily PPI ever since.    Past Medical History:   Diagnosis Date    H/O colonoscopy     Hyperlipemia     Hypertension     Renal calculi      Past Surgical History:   Procedure Laterality Date    BACK SURGERY N/A     CHOLECYSTECTOMY WITH INTRAOPERATIVE CHOLANGIOGRAM N/A 5/13/2023    Procedure: CHOLECYSTECTOMY LAPAROSCOPIC INTRAOPERATIVE CHOLANGIOGRAM;  Surgeon: Desi De Dios MD;  Location: [facility];  Service: General;  Laterality: N/A;    MOUTH SURGERY      TOOTH EXTRACTION     Family History   Problem Relation Age of Onset    Other Mother         MALIGNANT NEOPLASM     Cancer Mother     Hypertension Father     Other Father         MALIGNANT NEOPLASM     Cancer Father     No Known Problems Maternal Grandmother     No Known Problems Maternal Grandfather     Other Paternal Grandmother         MALIGNANT NEOPLASM     No Known Problems Paternal Grandfather      Social History     Tobacco Use    Smoking status: Never    Smokeless tobacco: Never   Vaping Use    Vaping Use: Never used   Substance Use Topics    Alcohol use: No    Drug use: No     Medications Prior to Admission   Medication Sig Dispense Refill Last Dose    atorvastatin (LIPITOR) 40 MG tablet Take 1 tablet by mouth Daily.   7/30/2023  "   cetirizine (zyrTEC) 10 MG tablet Take 1 tablet by mouth Daily.   7/30/2023    ergocalciferol (ERGOCALCIFEROL) 1.25 MG (61898 UT) capsule Take 1 capsule by mouth Every 7 (Seven) Days.   7/1/2023    losartan-HCTZ 100-12.5 combo dose Take 1 dose by mouth Daily.   7/30/2023    omeprazole (priLOSEC) 40 MG capsule Take 1 capsule by mouth Daily.       fluticasone (FLONASE) 50 MCG/ACT nasal spray 1 spray each nostril BID (Patient taking differently: 2 (Two) Times a Day As Needed. 1 spray each nostril BID) 1 each 5 Unknown    traMADol (ULTRAM) 50 MG tablet Take 1 tablet by mouth Every 6 (Six) Hours As Needed for Moderate Pain. 15 tablet 0      Allergies:  Norco [hydrocodone-acetaminophen]    REVIEW OF SYSTEMS:  Please see the above history of present illness for pertinent positives and negatives.  The remainder of the patient's systems have been reviewed and are negative.     Vital Signs  Temp:  [99 øF (37.2 øC)-100.8 øF (38.2 øC)] 99 øF (37.2 øC)  Heart Rate:  [75-94] 75  Resp:  [16-18] 16  BP: (117-173)/(64-83) 122/66    Flowsheet Rows      Flowsheet Row First Filed Value   Admission Height 157.5 cm (62\") Documented at 07/30/2023 1832   Admission Weight 72.6 kg (160 lb) Documented at 07/30/2023 1832             Physical Exam:  Physical Exam   Constitutional: Patient appears well-developed and well-nourished and in no acute distress   HEENT:   Head: Normocephalic and atraumatic.   Eyes:  Pupils are equal, round, and reactive to light. EOM are intact. Sclerae are anicteric and non-injected.  Mouth and Throat: Patient has moist mucous membranes. Oropharynx is clear of any erythema or exudate.     Neck: Neck supple. No JVD present. No thyromegaly present. No lymphadenopathy present.  Cardiovascular: Regular rate, regular rhythm, S1 normal and S2 normal.  Exam reveals no gallop and no friction rub.  No murmur heard.  Pulmonary/Chest: Lungs are clear to auscultation bilaterally. No respiratory distress. No wheezes. No " rhonchi. No rales.   Abdominal: Soft. Bowel sounds are normal. No distension and no mass. There is no hepatosplenomegaly. There is nMild/Mod epigastric to RUQ tenderness.   Musculoskeletal: Normal Muscle tone  Extremities: No edema. Pulses are palpable in all 4 extremities.  Neurological: Patient is alert and oriented to person, place, and time. Cranial nerves II-XII are grossly intact with no focal deficits.  Skin: Skin is warm. No rash noted. Nails show no clubbing.  No cyanosis or erythema.    Debilities/Disabilities Identified: None  Emotional Behavior: Appropriate     Results Review:   I reviewed the patient's new clinical results.    Lab Results (most recent)       Procedure Component Value Units Date/Time    IgG, IgA, IgM [[id number]]  (Abnormal) Collected: 07/31/23 0350    Specimen: Blood Updated: 07/31/23 1216     IgG 688 mg/dL      IgM 54 mg/dL      IgA 277 mg/dL     Ceruloplasmin [[id number]]  (Normal) Collected: 07/31/23 0623    Specimen: Blood Updated: 07/31/23 1047     Ceruloplasmin 23 mg/dL     Gamma GT [[id number]]  (Abnormal) Collected: 07/30/23 1931    Specimen: Blood Updated: 07/31/23 1046      U/L     Lactate Dehydrogenase [[id number]]  (Abnormal) Collected: 07/31/23 0623    Specimen: Blood Updated: 07/31/23 0800      U/L     Lipase [[id number]]  (Normal) Collected: 07/31/23 0623    Specimen: Blood Updated: 07/31/23 0800     Lipase 13 U/L     Ferritin [[id number]]  (Abnormal) Collected: 07/31/23 0623    Specimen: Blood Updated: 07/31/23 0800     Ferritin 440.00 ng/mL     Narrative:      Results may be falsely decreased if patient taking Biotin.      Mitochondrial Antibodies, M2 [[id number]] Collected: 07/31/23 0350    Specimen: Blood Updated: 07/31/23 0750    Anti-Smooth Muscle Antibody Titer [[id number]] Collected: 07/31/23 0350    Specimen: Blood Updated: 07/31/23 0750    Celiac Comprehensive Panel [[id number]] Collected: 07/31/23 0350    Specimen: Blood Updated: 07/31/23 0750    High  "Sensitivity Troponin T 2Hr [[id number]]  (Abnormal) Collected: 07/31/23 0623    Specimen: Blood Updated: 07/31/23 0644     HS Troponin T 15 ng/L      Troponin T Delta 3 ng/L     Narrative:      High Sensitive Troponin T Reference Range:  <10.0 ng/L- Negative Female for AMI  <15.0 ng/L- Negative Male for AMI  >=10 - Abnormal Female indicating possible myocardial injury.  >=15 - Abnormal Male indicating possible myocardial injury.   Clinicians would have to utilize clinical acumen, EKG, Troponin, and serial changes to determine if it is an Acute Myocardial Infarction or myocardial injury due to an underlying chronic condition.         Procalcitonin [[id number]]  (Abnormal) Collected: 07/31/23 0350    Specimen: Blood Updated: 07/31/23 0512     Procalcitonin 0.65 ng/mL     Narrative:      As a Marker for Sepsis (Non-Neonates):    1. <0.5 ng/mL represents a low risk of severe sepsis and/or septic shock.  2. >2 ng/mL represents a high risk of severe sepsis and/or septic shock.    As a Marker for Lower Respiratory Tract Infections that require antibiotic therapy:    PCT on Admission    Antibiotic Therapy       6-12 Hrs later    >0.5                Strongly Recommended  >0.25 - <0.5        Recommended   0.1 - 0.25          Discouraged              Remeasure/reassess PCT  <0.1                Strongly Discouraged     Remeasure/reassess PCT    As 28 day mortality risk marker: \"Change in Procalcitonin Result\" (>80% or <=80%) if Day 0 (or Day 1) and Day 4 values are available. Refer to http://www.[email or web address]-pct-calculator.com    Change in PCT <=80%  A decrease of PCT levels below or equal to 80% defines a positive change in PCT test result representing a higher risk for 28-day all-cause mortality of patients diagnosed with severe sepsis for septic shock.    Change in PCT >80%  A decrease of PCT levels of more than 80% defines a negative change in PCT result representing a lower risk for 28-day all-cause mortality of patients diagnosed " with severe sepsis or septic shock.       High Sensitivity Troponin T [[id number]]  (Abnormal) Collected: 07/31/23 0350    Specimen: Blood Updated: 07/31/23 0512     HS Troponin T 12 ng/L     Narrative:      High Sensitive Troponin T Reference Range:  <10.0 ng/L- Negative Female for AMI  <15.0 ng/L- Negative Male for AMI  >=10 - Abnormal Female indicating possible myocardial injury.  >=15 - Abnormal Male indicating possible myocardial injury.   Clinicians would have to utilize clinical acumen, EKG, Troponin, and serial changes to determine if it is an Acute Myocardial Infarction or myocardial injury due to an underlying chronic condition.         Comprehensive Metabolic Panel [[id number]]  (Abnormal) Collected: 07/31/23 0350    Specimen: Blood Updated: 07/31/23 0510     Glucose 90 mg/dL      BUN 12 mg/dL      Creatinine 0.82 mg/dL      Sodium 134 mmol/L      Potassium 4.0 mmol/L      Comment: Slight hemolysis detected by analyzer. Results may be affected.        Chloride 99 mmol/L      CO2 24.6 mmol/L      Calcium 8.7 mg/dL      Total Protein 5.7 g/dL      Albumin 3.3 g/dL      ALT (SGPT) 311 U/L      AST (SGOT) 295 U/L      Alkaline Phosphatase 226 U/L      Total Bilirubin 3.1 mg/dL      Globulin 2.4 gm/dL      A/G Ratio 1.4 g/dL      BUN/Creatinine Ratio 14.6     Anion Gap 10.4 mmol/L      eGFR 77.1 mL/min/1.73     Narrative:      GFR Normal >60  Chronic Kidney Disease <60  Kidney Failure <15      CMV IgG IgM [[id number]] Collected: 07/31/23 0350    Specimen: Blood Updated: 07/31/23 0427    AUDREY Comprehensive Panel [[id number]] Collected: 07/31/23 0350    Specimen: Blood Updated: 07/31/23 0427    EBV Antibody Profile [[id number]] Collected: 07/31/23 0350    Specimen: Blood Updated: 07/31/23 0427    Hepatitis Panel, Acute [[id number]] Collected: 07/30/23 2246    Specimen: Blood Updated: 07/30/23 2252    Protime-INR [[id number]]  (Normal) Collected: 07/30/23 1931    Specimen: Blood Updated: 07/30/23 2251     Protime 12.6  Seconds      INR 0.94    Narrative:      Therapeutic Ranges for INR: 2.0-3.0 (PT 20-30)                              2.5-3.5 (PT 25-34)    aPTT [[id number]]  (Normal) Collected: 07/30/23 1931    Specimen: Blood Updated: 07/30/23 2251     PTT 25.6 seconds     Narrative:      PTT = The equivalent PTT values for the therapeutic range of heparin levels at 0.1 to 0.7 U/ml are 53 to 110 seconds.      Mononucleosis Screen [[id number]]  (Normal) Collected: 07/30/23 1931    Specimen: Blood Updated: 07/30/23 2246     Monospot Negative    Urinalysis, Microscopic Only - Urine, Clean Catch [[id number]]  (Abnormal) Collected: 07/30/23 2015    Specimen: Urine, Clean Catch Updated: 07/30/23 2054     RBC, UA 0-2 /HPF      WBC, UA 3-5 /HPF      Bacteria, UA 1+ /HPF      Squamous Epithelial Cells, UA 3-6 /HPF      Hyaline Casts, UA None Seen /LPF      Amorphous Crystals, UA Moderate/2+ /HPF      Methodology Manual Light Microscopy    Urinalysis With Microscopic If Indicated (No Culture) - Urine, Clean Catch [[id number]]  (Abnormal) Collected: 07/30/23 2015    Specimen: Urine, Clean Catch Updated: 07/30/23 2040     Color, UA Dark Yellow     Appearance, UA Cloudy     pH, UA 8.5     Specific Gravity, UA 1.020     Glucose, UA Negative     Ketones, UA 40 mg/dL (2+)     Bilirubin, UA Small (1+)     Blood, UA Negative     Protein, UA 30 mg/dL (1+)     Leuk Esterase, UA Trace     Nitrite, UA Negative     Urobilinogen, UA 2.0 E.U./dL    Magnesium [[id number]]  (Normal) Collected: 07/30/23 1931    Specimen: Blood Updated: 07/30/23 2005     Magnesium 2.0 mg/dL     Comprehensive Metabolic Panel [[id number]]  (Abnormal) Collected: 07/30/23 1931    Specimen: Blood Updated: 07/30/23 2005     Glucose 102 mg/dL      BUN 14 mg/dL      Creatinine 0.90 mg/dL      Sodium 136 mmol/L      Potassium 3.5 mmol/L      Comment: Slight hemolysis detected by analyzer. Results may be affected.        Chloride 100 mmol/L      CO2 24.4 mmol/L      Calcium 9.7 mg/dL       "Total Protein 6.9 g/dL      Albumin 4.1 g/dL      ALT (SGPT) 339 U/L      AST (SGOT) 423 U/L      Alkaline Phosphatase 237 U/L      Total Bilirubin 2.4 mg/dL      Globulin 2.8 gm/dL      A/G Ratio 1.5 g/dL      BUN/Creatinine Ratio 15.6     Anion Gap 11.6 mmol/L      eGFR 68.9 mL/min/1.73     Narrative:      GFR Normal >60  Chronic Kidney Disease <60  Kidney Failure <15      Procalcitonin [[id number]]  (Normal) Collected: 07/30/23 1931    Specimen: Blood Updated: 07/30/23 2003     Procalcitonin 0.09 ng/mL     Narrative:      As a Marker for Sepsis (Non-Neonates):    1. <0.5 ng/mL represents a low risk of severe sepsis and/or septic shock.  2. >2 ng/mL represents a high risk of severe sepsis and/or septic shock.    As a Marker for Lower Respiratory Tract Infections that require antibiotic therapy:    PCT on Admission    Antibiotic Therapy       6-12 Hrs later    >0.5                Strongly Recommended  >0.25 - <0.5        Recommended   0.1 - 0.25          Discouraged              Remeasure/reassess PCT  <0.1                Strongly Discouraged     Remeasure/reassess PCT    As 28 day mortality risk marker: \"Change in Procalcitonin Result\" (>80% or <=80%) if Day 0 (or Day 1) and Day 4 values are available. Refer to http://www.[facility]-pct-calculator.com    Change in PCT <=80%  A decrease of PCT levels below or equal to 80% defines a positive change in PCT test result representing a higher risk for 28-day all-cause mortality of patients diagnosed with severe sepsis for septic shock.    Change in PCT >80%  A decrease of PCT levels of more than 80% defines a negative change in PCT result representing a lower risk for 28-day all-cause mortality of patients diagnosed with severe sepsis or septic shock.       Single High Sensitivity Troponin T [[id number]]  (Abnormal) Collected: 07/30/23 1931    Specimen: Blood Updated: 07/30/23 2001     HS Troponin T 11 ng/L     Narrative:      High Sensitive Troponin T Reference Range:  <10.0 " ng/L- Negative Female for AMI  <15.0 ng/L- Negative Male for AMI  >=10 - Abnormal Female indicating possible myocardial injury.  >=15 - Abnormal Male indicating possible myocardial injury.   Clinicians would have to utilize clinical acumen, EKG, Troponin, and serial changes to determine if it is an Acute Myocardial Infarction or myocardial injury due to an underlying chronic condition.         Lipase [[id number]]  (Normal) Collected: 07/30/23 1931    Specimen: Blood Updated: 07/30/23 1959     Lipase 17 U/L     Lactic Acid, Plasma [[id number]]  (Normal) Collected: 07/30/23 1931    Specimen: Blood Updated: 07/30/23 1953     Lactate 1.0 mmol/L     CBC & Differential [[id number]]  (Normal) Collected: 07/30/23 1931    Specimen: Blood Updated: 07/30/23 1935    Narrative:      The following orders were created for panel order CBC & Differential.  Procedure                               Abnormality         Status                     ---------                               -----------         ------                     CBC Auto Differential[[id number]]        Normal              Final result                 Please view results for these tests on the individual orders.    CBC Auto Differential [[id number]]  (Normal) Collected: 07/30/23 1931    Specimen: Blood Updated: 07/30/23 1935     WBC 5.77 10*3/mm3      RBC 4.32 10*6/mm3      Hemoglobin 13.3 g/dL      Hematocrit 39.6 %      MCV 91.7 fL      MCH 30.8 pg      MCHC 33.6 g/dL      RDW 13.5 %      RDW-SD 46.0 fl      MPV 10.6 fL      Platelets 202 10*3/mm3      Neutrophil % 63.2 %      Lymphocyte % 22.2 %      Monocyte % 11.3 %      Eosinophil % 2.6 %      Basophil % 0.5 %      Immature Grans % 0.2 %      Neutrophils, Absolute 3.65 10*3/mm3      Lymphocytes, Absolute 1.28 10*3/mm3      Monocytes, Absolute 0.65 10*3/mm3      Eosinophils, Absolute 0.15 10*3/mm3      Basophils, Absolute 0.03 10*3/mm3      Immature Grans, Absolute 0.01 10*3/mm3      nRBC 0.0 /100 WBC     Blood Culture  - Blood, Hand, Right [[id number]] Collected: 07/30/23 1932    Specimen: Blood from Hand, Right Updated: 07/30/23 1932    Blood Culture - Blood, Arm, Left [[id number]] Collected: 07/30/23 1931    Specimen: Blood from Arm, Left Updated: 07/30/23 1932            Imaging Results (Most Recent)       Procedure Component Value Units Date/Time    US Liver [[id number]] Collected: 07/31/23 0752     Updated: 07/31/23 0857    Narrative:      EXAM: Liver ultrasound     INDICATION: Cholecystectomy 2 months ago abdominal pain for 3 days and  elevated liver enzymes.     FINDINGS: The visualized portions of the pancreas are normal. The liver  is normal in echogenicity. No fluid collections or masses are visible.  Gallbladder has been removed portal vein shows flow into the liver.  Common bile duct is 4 mm in diameter. Right kidney is 10 cm in length.  Renal pelvis is within normal limits. Patient had a CT scan yesterday  which showed mild prominence of each renal pelvis versus parapelvic  cysts. Ureters are nondilated.       Impression:      Negative liver ultrasound     This report was finalized on 7/31/2023 8:55 AM by Dr. Denilson Arzate MD.       CT Abdomen Pelvis With Contrast [[id number]] Collected: 07/30/23 2045     Updated: 07/30/23 2157    Narrative:      ABDOMEN PELVIS CT WITH CONTRAST     HISTORY:  Cholecystectomy 2 months ago. Epigastric pain today with abnormal liver  function tests.     TECHNIQUE:   CT abdomen pelvis performed during the intravenous ministration of  contrast. COMPARISON study is from 5/13/2023 prior to the  cholecystectomy. Sagittal and coronal reconstructed imaging obtained.  Oral contrast media also given. Radiation dose reduction techniques  included automated exposure control or exposure modulation based on body  size. Radiation audit for CT and nuclear cardiology exams in the last 12  months: 1.     ABDOMEN FINDINGS:   Lung bases: There is dependent atelectasis.     There our postoperative changes of  cholecystectomy. There is no fluid  collection in the postoperative bed. There is mild periportal edema that  is new from the previous study. Please correlate for clinical evidence  of hepatitis/ Liver dysfunction. There is no definite enlargement of the  common bile duct. There may be mild intrahepatic biliary ductal  dilatation in addition to the periportal edema. The pancreas, adrenal  glands, spleen, are unremarkable.        There is streak artifact from spine hardware. There are probably  bilateral parapelvic renal cysts and a tiny right lower pole  nonobstructing renal calculus. No convincing evidence for  hydronephrosis. Appearance of the kidneys is similar to prior.     There are atherosclerotic vascular calcifications involving the  abdominal aorta and branch vessels. No retroperitoneal lymphadenopathy  is suspected.     There is sigmoid diverticulosis. There is no CT evidence for  diverticulitis. There is no evidence for bowel obstruction or free air.  The appendix is unremarkable.     PELVIS FINDINGS:   Bladder is unremarkable. Uterus and adnexal structures are within normal  limits.     There are degenerative changes in the postoperative spine. There doesn't  appear to be fusion across the L3-4 intervertebral disc spacer.       Impression:      1. Post cholecystectomy. No evidence for fluid collection in the  postoperative bed. There is mild periportal edema that is new from the  prior study. Please correlate for clinical evidence of hepatitis or  liver dysfunction. There may be mild intrahepatic biliary ductal  dilatation but the common bile duct doesn't appear to be dilated.  2. Occasional sigmoid diverticula without evidence of diverticulitis. No  bowel obstruction or free air.  3. Postoperative changes to the spine        This report was finalized on 7/30/2023 9:55 PM by Dr. Roya Mcintosh MD.             reviewed    ECG/EMG Results (most recent)       Procedure Component Value Units Date/Time    ECG  12 Lead Other; epigastric pain [[id number]] Collected: 07/30/23 2003     Updated: 07/31/23 0742     QT Interval 357 ms     Narrative:      HEART RATE= 85  bpm  RR Interval= 704  ms  OR Interval= 161  ms  P Horizontal Axis= -8  deg  P Front Axis= 39  deg  QRSD Interval= 81  ms  QT Interval= 357  ms  QRS Axis= 5  deg  T Wave Axis= 17  deg  - NORMAL ECG -  Sinus rhythm  When compared with ECG of 29-Jul-2023 19:30:00,  No significant change   Electronically Signed By: Matthew Reynolds ([facility]) 31-Jul-2023 07:42:19  Date and Time of Study: 2023-07-30 20:03:40          reviewed    Assessment & Plan   Abdominal Pain  LFTS  Recent Lap Vania  History of GERD and Stricture  Normal Colon 2 years ago    Overal her pain argues for Biliary cholic but without a dilated duct will defer to a non invasive cholangiogram prior to considering an ERCP. If her MRCP is negative would consider diagnostic EGD while we await the rest of her Serologies. Also agree with holding her Statin until this resolves.              I discussed the patient's findings and my recommendations with patient.     Macario Worley MD  07/31/23  12:25 EDT    Time: 10 min prior to procedure.

## 2023-07-31 NOTE — H&P
"[facility] HOSPITALIST     Korey Wylie MD    CHIEF COMPLAINT:  Abdominal Pain    HISTORY OF PRESENT ILLNESS:    Ms. Montejo is a pleasant, 71 y/o  female who re-presented to the ER w/ continued lower chest/mid-epigastric pain.  She initially noticed symptoms on Saturday.  She was visiting her daughter at the time and developed chest pain.  She was evaluated in the ER that evening w/ a normal work-up.  It was felt that she likely had a reflux episode and was discharged.  She felt well for the rest of the evening until around 12:30 on Sunday, she went to drink a glass of water when the pain abruptly returned feelling like someone was \"squeezing\" and \"twisting\" her insides.  She took a dose of her Zyrtec and BP medicine because they make her sleepy, but this did not resolve the pain.  She denies vomiting and diarrhea.  She denies skin yellowing and change in stool color.  She denies tick exposure.  She denies recent medicine change or initiation.  No herbal supplements reported.  The distribution of the pain is a little different than her presentation back in May when she underwent cholecystectomy.  She returned to the ER and LFT's were even higher than the previous evening's results.      Past Medical History:   Diagnosis Date    H/O colonoscopy     Hyperlipemia     Hypertension     Renal calculi      Past Surgical History:   Procedure Laterality Date    BACK SURGERY N/A     CHOLECYSTECTOMY WITH INTRAOPERATIVE CHOLANGIOGRAM N/A 5/13/2023    Procedure: CHOLECYSTECTOMY LAPAROSCOPIC INTRAOPERATIVE CHOLANGIOGRAM;  Surgeon: Desi De Dios MD;  Location: [facility];  Service: General;  Laterality: N/A;    MOUTH SURGERY      TOOTH EXTRACTION     Family History   Problem Relation Age of Onset    Other Mother         MALIGNANT NEOPLASM     Cancer Mother     Hypertension Father     Other Father         MALIGNANT NEOPLASM     Cancer Father     No Known Problems Maternal Grandmother     No " "Known Problems Maternal Grandfather     Other Paternal Grandmother         MALIGNANT NEOPLASM     No Known Problems Paternal Grandfather      Social History     Tobacco Use    Smoking status: Never    Smokeless tobacco: Never   Vaping Use    Vaping Use: Never used   Substance Use Topics    Alcohol use: No    Drug use: No     Medications Prior to Admission   Medication Sig Dispense Refill Last Dose    atorvastatin (LIPITOR) 40 MG tablet Take 1 tablet by mouth Daily.       fluticasone (FLONASE) 50 MCG/ACT nasal spray 1 spray each nostril BID 1 each 5     losartan-HCTZ 100-12.5 combo dose Take 1 dose by mouth Daily.       meloxicam (MOBIC) 15 MG tablet Take 1 tablet by mouth Daily. 5 tablet 0     omeprazole (priLOSEC) 20 MG capsule Take 1 capsule by mouth Daily.       ondansetron (ZOFRAN) 4 MG tablet Take 1 tablet by mouth Every 8 (Eight) Hours As Needed for Nausea or Vomiting for up to 7 days. 21 tablet 0     traMADol (ULTRAM) 50 MG tablet Take 1 tablet by mouth Every 6 (Six) Hours As Needed for Moderate Pain. 15 tablet 0      Allergies:  Norco [hydrocodone-acetaminophen]    REVIEW OF SYSTEMS:  Please see the above history of present illness for pertinent positives and negatives.  The remainder of the patient's systems have been reviewed and are negative.    Vital Signs  Temp:  [99.9 øF (37.7 øC)-100.2 øF (37.9 øC)] 100.2 øF (37.9 øC)  Heart Rate:  [79-94] 93  Resp:  [16-18] 16  BP: (119-173)/(68-83) 119/68  Oxygen Therapy  SpO2: 94 %  Pulse Oximetry Type: Intermittent  Device (Oxygen Therapy): room air}  Body mass index is 30.22 kg/mý.  Flowsheet Rows      Flowsheet Row First Filed Value   Admission Height 157.5 cm (62\") Documented at 07/30/2023 1832   Admission Weight 72.6 kg (160 lb) Documented at 07/30/2023 1832               Physical Exam:  Physical Exam   Constitutional: Patient appears well developed and well nourished and in no acute distress   HEENT:   Head: Normocephalic and atraumatic.   Eyes:  Pupils are " equal, round, and reactive to light. EOM are intact. Sclerae are anicteric and noninjected.  Mouth and Throat: Patient has moist mucous membranes. Oropharynx is clear of any erythema or exudate.     Neck: Neck supple. No JVD present. No thyromegaly present. No lymphadenopathy present.  Cardiovascular: Regular rate, regular rhythm, S1 normal and S2 normal.  Exam reveals no gallop and no friction rub.  No murmur heard.  Radial and pedal pulses are 2+ and symmetric.  Pulmonary/Chest: Lungs are clear to auscultation bilaterally. No respiratory distress. No wheezes. No rhonchi. No rales.   Abdominal: Obese. Soft. Bowel sounds are normal. No distension and no mass. There is no tenderness.   Musculoskeletal: Normal muscle tone  Extremities: No edema.   Neurological: Patient is alert and oriented to person, place, and time. Cranial nerves II-XII are grossly intact with no focal deficits.  Skin: Skin is warm. No rash noted. Nails show no clubbing.  No cyanosis or erythema.    Emotional Behavior:    Judgment and Insight: Normal   Mental Status:  Alertness  Normal   Memory:  Appears intact   Mood and Affect:         Depression  None               Anxiety  None    Debilities:   Physical Weakness  None   Handicaps  None   Disabilities  None   Agitation  None     Results Review:    I reviewed the patient's new clinical results.  Lab Results (most recent)       Procedure Component Value Units Date/Time    Hepatitis Panel, Acute [[id number]] Collected: 07/30/23 2246    Specimen: Blood Updated: 07/30/23 2252    Protime-INR [[id number]]  (Normal) Collected: 07/30/23 1931    Specimen: Blood Updated: 07/30/23 2251     Protime 12.6 Seconds      INR 0.94    Narrative:      Therapeutic Ranges for INR: 2.0-3.0 (PT 20-30)                              2.5-3.5 (PT 25-34)    aPTT [[id number]]  (Normal) Collected: 07/30/23 1931    Specimen: Blood Updated: 07/30/23 2251     PTT 25.6 seconds     Narrative:      PTT = The equivalent PTT values for  the therapeutic range of heparin levels at 0.1 to 0.7 U/ml are 53 to 110 seconds.      Mononucleosis Screen [[id number]]  (Normal) Collected: 07/30/23 1931    Specimen: Blood Updated: 07/30/23 2246     Monospot Negative    Urinalysis, Microscopic Only - Urine, Clean Catch [[id number]]  (Abnormal) Collected: 07/30/23 2015    Specimen: Urine, Clean Catch Updated: 07/30/23 2054     RBC, UA 0-2 /HPF      WBC, UA 3-5 /HPF      Bacteria, UA 1+ /HPF      Squamous Epithelial Cells, UA 3-6 /HPF      Hyaline Casts, UA None Seen /LPF      Amorphous Crystals, UA Moderate/2+ /HPF      Methodology Manual Light Microscopy    Urinalysis With Microscopic If Indicated (No Culture) - Urine, Clean Catch [[id number]]  (Abnormal) Collected: 07/30/23 2015    Specimen: Urine, Clean Catch Updated: 07/30/23 2040     Color, UA Dark Yellow     Appearance, UA Cloudy     pH, UA 8.5     Specific Gravity, UA 1.020     Glucose, UA Negative     Ketones, UA 40 mg/dL (2+)     Bilirubin, UA Small (1+)     Blood, UA Negative     Protein, UA 30 mg/dL (1+)     Leuk Esterase, UA Trace     Nitrite, UA Negative     Urobilinogen, UA 2.0 E.U./dL    Magnesium [[id number]]  (Normal) Collected: 07/30/23 1931    Specimen: Blood Updated: 07/30/23 2005     Magnesium 2.0 mg/dL     Comprehensive Metabolic Panel [[id number]]  (Abnormal) Collected: 07/30/23 1931    Specimen: Blood Updated: 07/30/23 2005     Glucose 102 mg/dL      BUN 14 mg/dL      Creatinine 0.90 mg/dL      Sodium 136 mmol/L      Potassium 3.5 mmol/L      Comment: Slight hemolysis detected by analyzer. Results may be affected.        Chloride 100 mmol/L      CO2 24.4 mmol/L      Calcium 9.7 mg/dL      Total Protein 6.9 g/dL      Albumin 4.1 g/dL      ALT (SGPT) 339 U/L      AST (SGOT) 423 U/L      Alkaline Phosphatase 237 U/L      Total Bilirubin 2.4 mg/dL      Globulin 2.8 gm/dL      A/G Ratio 1.5 g/dL      BUN/Creatinine Ratio 15.6     Anion Gap 11.6 mmol/L      eGFR 68.9 mL/min/1.73     Narrative:       "GFR Normal >60  Chronic Kidney Disease <60  Kidney Failure <15      Procalcitonin [[id number]]  (Normal) Collected: 07/30/23 1931    Specimen: Blood Updated: 07/30/23 2003     Procalcitonin 0.09 ng/mL     Narrative:      As a Marker for Sepsis (Non-Neonates):    1. <0.5 ng/mL represents a low risk of severe sepsis and/or septic shock.  2. >2 ng/mL represents a high risk of severe sepsis and/or septic shock.    As a Marker for Lower Respiratory Tract Infections that require antibiotic therapy:    PCT on Admission    Antibiotic Therapy       6-12 Hrs later    >0.5                Strongly Recommended  >0.25 - <0.5        Recommended   0.1 - 0.25          Discouraged              Remeasure/reassess PCT  <0.1                Strongly Discouraged     Remeasure/reassess PCT    As 28 day mortality risk marker: \"Change in Procalcitonin Result\" (>80% or <=80%) if Day 0 (or Day 1) and Day 4 values are available. Refer to http://[email or web address]-pct-calculator.com    Change in PCT <=80%  A decrease of PCT levels below or equal to 80% defines a positive change in PCT test result representing a higher risk for 28-day all-cause mortality of patients diagnosed with severe sepsis for septic shock.    Change in PCT >80%  A decrease of PCT levels of more than 80% defines a negative change in PCT result representing a lower risk for 28-day all-cause mortality of patients diagnosed with severe sepsis or septic shock.       Single High Sensitivity Troponin T [[id number]]  (Abnormal) Collected: 07/30/23 1931    Specimen: Blood Updated: 07/30/23 2001     HS Troponin T 11 ng/L     Narrative:      High Sensitive Troponin T Reference Range:  <10.0 ng/L- Negative Female for AMI  <15.0 ng/L- Negative Male for AMI  >=10 - Abnormal Female indicating possible myocardial injury.  >=15 - Abnormal Male indicating possible myocardial injury.   Clinicians would have to utilize clinical acumen, EKG, Troponin, and serial changes to determine if it is an Acute " Myocardial Infarction or myocardial injury due to an underlying chronic condition.         Lipase [[id number]]  (Normal) Collected: 07/30/23 1931    Specimen: Blood Updated: 07/30/23 1959     Lipase 17 U/L     Lactic Acid, Plasma [[id number]]  (Normal) Collected: 07/30/23 1931    Specimen: Blood Updated: 07/30/23 1953     Lactate 1.0 mmol/L     CBC & Differential [[id number]]  (Normal) Collected: 07/30/23 1931    Specimen: Blood Updated: 07/30/23 1935    Narrative:      The following orders were created for panel order CBC & Differential.  Procedure                               Abnormality         Status                     ---------                               -----------         ------                     CBC Auto Differential[[id number]]        Normal              Final result                 Please view results for these tests on the individual orders.    CBC Auto Differential [[id number]]  (Normal) Collected: 07/30/23 1931    Specimen: Blood Updated: 07/30/23 1935     WBC 5.77 10*3/mm3      RBC 4.32 10*6/mm3      Hemoglobin 13.3 g/dL      Hematocrit 39.6 %      MCV 91.7 fL      MCH 30.8 pg      MCHC 33.6 g/dL      RDW 13.5 %      RDW-SD 46.0 fl      MPV 10.6 fL      Platelets 202 10*3/mm3      Neutrophil % 63.2 %      Lymphocyte % 22.2 %      Monocyte % 11.3 %      Eosinophil % 2.6 %      Basophil % 0.5 %      Immature Grans % 0.2 %      Neutrophils, Absolute 3.65 10*3/mm3      Lymphocytes, Absolute 1.28 10*3/mm3      Monocytes, Absolute 0.65 10*3/mm3      Eosinophils, Absolute 0.15 10*3/mm3      Basophils, Absolute 0.03 10*3/mm3      Immature Grans, Absolute 0.01 10*3/mm3      nRBC 0.0 /100 WBC     Blood Culture - Blood, Hand, Right [[id number]] Collected: 07/30/23 1932    Specimen: Blood from Hand, Right Updated: 07/30/23 1932    Blood Culture - Blood, Arm, Left [[id number]] Collected: 07/30/23 1931    Specimen: Blood from Arm, Left Updated: 07/30/23 1932            Imaging Results (Most Recent)       Procedure  Component Value Units Date/Time    CT Abdomen Pelvis With Contrast [[id number]] Collected: 07/30/23 2045     Updated: 07/30/23 2157    Narrative:      ABDOMEN PELVIS CT WITH CONTRAST     HISTORY:  Cholecystectomy 2 months ago. Epigastric pain today with abnormal liver  function tests.     TECHNIQUE:   CT abdomen pelvis performed during the intravenous ministration of  contrast. COMPARISON study is from 5/13/2023 prior to the  cholecystectomy. Sagittal and coronal reconstructed imaging obtained.  Oral contrast media also given. Radiation dose reduction techniques  included automated exposure control or exposure modulation based on body  size. Radiation audit for CT and nuclear cardiology exams in the last 12  months: 1.     ABDOMEN FINDINGS:   Lung bases: There is dependent atelectasis.     There our postoperative changes of cholecystectomy. There is no fluid  collection in the postoperative bed. There is mild periportal edema that  is new from the previous study. Please correlate for clinical evidence  of hepatitis/ Liver dysfunction. There is no definite enlargement of the  common bile duct. There may be mild intrahepatic biliary ductal  dilatation in addition to the periportal edema. The pancreas, adrenal  glands, spleen, are unremarkable.        There is streak artifact from spine hardware. There are probably  bilateral parapelvic renal cysts and a tiny right lower pole  nonobstructing renal calculus. No convincing evidence for  hydronephrosis. Appearance of the kidneys is similar to prior.     There are atherosclerotic vascular calcifications involving the  abdominal aorta and branch vessels. No retroperitoneal lymphadenopathy  is suspected.     There is sigmoid diverticulosis. There is no CT evidence for  diverticulitis. There is no evidence for bowel obstruction or free air.  The appendix is unremarkable.     PELVIS FINDINGS:   Bladder is unremarkable. Uterus and adnexal structures are within normal  limits.      There are degenerative changes in the postoperative spine. There doesn't  appear to be fusion across the L3-4 intervertebral disc spacer.       Impression:      1. Post cholecystectomy. No evidence for fluid collection in the  postoperative bed. There is mild periportal edema that is new from the  prior study. Please correlate for clinical evidence of hepatitis or  liver dysfunction. There may be mild intrahepatic biliary ductal  dilatation but the common bile duct doesn't appear to be dilated.  2. Occasional sigmoid diverticula without evidence of diverticulitis. No  bowel obstruction or free air.  3. Postoperative changes to the spine        This report was finalized on 7/30/2023 9:55 PM by Dr. Roya Mcintosh MD.             reviewed    ECG/EMG Results (most recent)       Procedure Component Value Units Date/Time    ECG 12 Lead Other; epigastric pain [[id number]] Collected: 07/30/23 2003     Updated: 07/30/23 2006     QT Interval 357 ms     Narrative:      HEART RATE= 85  bpm  RR Interval= 704  ms  NV Interval= 161  ms  P Horizontal Axis= -8  deg  P Front Axis= 39  deg  QRSD Interval= 81  ms  QT Interval= 357  ms  QRS Axis= 5  deg  T Wave Axis= 17  deg  - NORMAL ECG -  Sinus rhythm  Electronically Signed By:   Date and Time of Study: 2023-07-30 20:03:40          Reviewed and compared to 5/2023 w/o dynamic change.    Assessment & Plan     Transaminitis: Etiology unclear.  I've ordered CMV,EBV (although she denies sore throat and recent Mono was negative), and AUDREY profile.  I could not find an associated between eosinophilic esophagitis and liver inflammation nor autoimmune pathology.  Hepatitis panel is also pending.  It would be odd to develop a bile leak this far out from surgery although a retained stone may be plausible.  However, IOC was negative in May.  No tick exposure or new medications. Thankfully, her synthetic function appears normal.  Will repeat panel in A.M.  Check U/S.  Will also ask Dr. Worley  to see.  Essential Hypertension: Will continue Losartan.  Hold HCTZ given initiation of IVF.  She was at goal on exam.  Eosinophile Esophagitis: Continue PPI therapy.  Obesity: Body mass index is 30.22 kg/mý.  Nutrition eval once acute illness resolves.    I discussed the patient's findings and my recommendations with patient.     Ludwig Tracey MD  07/31/23  00:18 EDT

## 2023-08-01 VITALS
HEART RATE: 76 BPM | DIASTOLIC BLOOD PRESSURE: 70 MMHG | SYSTOLIC BLOOD PRESSURE: 137 MMHG | RESPIRATION RATE: 16 BRPM | OXYGEN SATURATION: 97 % | WEIGHT: 165.2 LBS | TEMPERATURE: 98.6 F | HEIGHT: 62 IN | BODY MASS INDEX: 30.4 KG/M2

## 2023-08-01 LAB
ADV 40+41 DNA STL QL NAA+NON-PROBE: NOT DETECTED
ALBUMIN SERPL-MCNC: 3.6 G/DL (ref 3.5–5.2)
ALBUMIN/GLOB SERPL: 1.3 G/DL
ALP SERPL-CCNC: 242 U/L (ref 39–117)
ALT SERPL W P-5'-P-CCNC: 220 U/L (ref 1–33)
ANION GAP SERPL CALCULATED.3IONS-SCNC: 10.9 MMOL/L (ref 5–15)
AST SERPL-CCNC: 130 U/L (ref 1–32)
ASTRO TYP 1-8 RNA STL QL NAA+NON-PROBE: NOT DETECTED
BACTERIA BLD CULT: ABNORMAL
BASOPHILS # BLD AUTO: 0.02 10*3/MM3 (ref 0–0.2)
BASOPHILS NFR BLD AUTO: 0.3 % (ref 0–1.5)
BILIRUB SERPL-MCNC: 3.3 MG/DL (ref 0–1.2)
BOTTLE TYPE: ABNORMAL
BUN SERPL-MCNC: 9 MG/DL (ref 8–23)
BUN/CREAT SERPL: 12.7 (ref 7–25)
C CAYETANENSIS DNA STL QL NAA+NON-PROBE: NOT DETECTED
C COLI+JEJ+UPSA DNA STL QL NAA+NON-PROBE: NOT DETECTED
CALCIUM SPEC-SCNC: 8.9 MG/DL (ref 8.6–10.5)
CENTROMERE B AB SER-ACNC: <0.2 AI (ref 0–0.9)
CHLORIDE SERPL-SCNC: 105 MMOL/L (ref 98–107)
CHROMATIN AB SERPL-ACNC: <0.2 AI (ref 0–0.9)
CLUMPED PLATELETS: PRESENT
CMV IGG SERPL IA-ACNC: 5.9 U/ML (ref 0–0.59)
CMV IGM SERPL IA-ACNC: <30 AU/ML (ref 0–29.9)
CO2 SERPL-SCNC: 23.1 MMOL/L (ref 22–29)
CREAT SERPL-MCNC: 0.71 MG/DL (ref 0.57–1)
CRYPTOSP DNA STL QL NAA+NON-PROBE: NOT DETECTED
DEPRECATED RDW RBC AUTO: 49 FL (ref 37–54)
DSDNA AB SER-ACNC: 2 IU/ML (ref 0–9)
E HISTOLYT DNA STL QL NAA+NON-PROBE: NOT DETECTED
EAEC PAA PLAS AGGR+AATA ST NAA+NON-PRB: NOT DETECTED
EBV NA IGG SER IA-ACNC: 466 U/ML (ref 0–17.9)
EBV VCA IGG SER IA-ACNC: 68.1 U/ML (ref 0–17.9)
EBV VCA IGM SER IA-ACNC: <36 U/ML (ref 0–35.9)
EC STX1+STX2 GENES STL QL NAA+NON-PROBE: NOT DETECTED
EGFRCR SERPLBLD CKD-EPI 2021: 91.6 ML/MIN/1.73
ENA JO1 AB SER-ACNC: <0.2 AI (ref 0–0.9)
ENA RNP AB SER-ACNC: <0.2 AI (ref 0–0.9)
ENA SCL70 AB SER-ACNC: <0.2 AI (ref 0–0.9)
ENA SM AB SER-ACNC: <0.2 AI (ref 0–0.9)
ENA SS-A AB SER-ACNC: 0.5 AI (ref 0–0.9)
ENA SS-B AB SER-ACNC: <0.2 AI (ref 0–0.9)
ENDOMYSIUM IGA SER QL: NEGATIVE
EOSINOPHIL # BLD AUTO: 0.2 10*3/MM3 (ref 0–0.4)
EOSINOPHIL NFR BLD AUTO: 3.4 % (ref 0.3–6.2)
EPEC EAE GENE STL QL NAA+NON-PROBE: DETECTED
ERYTHROCYTE [DISTWIDTH] IN BLOOD BY AUTOMATED COUNT: 14.1 % (ref 12.3–15.4)
ETEC LTA+ST1A+ST1B TOX ST NAA+NON-PROBE: NOT DETECTED
G LAMBLIA DNA STL QL NAA+NON-PROBE: NOT DETECTED
GLIADIN PEPTIDE IGA SER-ACNC: 16 UNITS (ref 0–19)
GLIADIN PEPTIDE IGG SER-ACNC: 4 UNITS (ref 0–19)
GLOBULIN UR ELPH-MCNC: 2.7 GM/DL
GLUCOSE SERPL-MCNC: 100 MG/DL (ref 65–99)
HCT VFR BLD AUTO: 39.1 % (ref 34–46.6)
HGB BLD-MCNC: 12.6 G/DL (ref 12–15.9)
IGA SERPL-MCNC: 352 MG/DL (ref 87–352)
IMM GRANULOCYTES # BLD AUTO: 0.01 10*3/MM3 (ref 0–0.05)
IMM GRANULOCYTES NFR BLD AUTO: 0.2 % (ref 0–0.5)
LYMPHOCYTES # BLD AUTO: 0.62 10*3/MM3 (ref 0.7–3.1)
LYMPHOCYTES NFR BLD AUTO: 10.6 % (ref 19.6–45.3)
Lab: NORMAL
MCH RBC QN AUTO: 30.6 PG (ref 26.6–33)
MCHC RBC AUTO-ENTMCNC: 32.2 G/DL (ref 31.5–35.7)
MCV RBC AUTO: 94.9 FL (ref 79–97)
MITOCHONDRIA M2 IGG SER-ACNC: <20 UNITS (ref 0–20)
MONOCYTES # BLD AUTO: 0.86 10*3/MM3 (ref 0.1–0.9)
MONOCYTES NFR BLD AUTO: 14.7 % (ref 5–12)
NEUTROPHILS NFR BLD AUTO: 4.16 10*3/MM3 (ref 1.7–7)
NEUTROPHILS NFR BLD AUTO: 70.8 % (ref 42.7–76)
NOROVIRUS GI+II RNA STL QL NAA+NON-PROBE: NOT DETECTED
NRBC BLD AUTO-RTO: 0 /100 WBC (ref 0–0.2)
P SHIGELLOIDES DNA STL QL NAA+NON-PROBE: NOT DETECTED
PLATELET # BLD AUTO: 159 10*3/MM3 (ref 140–450)
PMV BLD AUTO: 11.4 FL (ref 6–12)
POTASSIUM SERPL-SCNC: 3.6 MMOL/L (ref 3.5–5.2)
PROCALCITONIN SERPL-MCNC: 0.32 NG/ML (ref 0–0.25)
PROT SERPL-MCNC: 6.3 G/DL (ref 6–8.5)
RBC # BLD AUTO: 4.12 10*6/MM3 (ref 3.77–5.28)
RBC MORPH BLD: NORMAL
RVA RNA STL QL NAA+NON-PROBE: NOT DETECTED
S ENT+BONG DNA STL QL NAA+NON-PROBE: NOT DETECTED
SAPO I+II+IV+V RNA STL QL NAA+NON-PROBE: NOT DETECTED
SERVICE CMNT-IMP: ABNORMAL
SHIGELLA SP+EIEC IPAH ST NAA+NON-PROBE: NOT DETECTED
SMA IGG SER-ACNC: 5 UNITS (ref 0–19)
SODIUM SERPL-SCNC: 139 MMOL/L (ref 136–145)
TTG IGA SER-ACNC: <2 U/ML (ref 0–3)
TTG IGG SER-ACNC: 5 U/ML (ref 0–5)
V CHOL+PARA+VUL DNA STL QL NAA+NON-PROBE: NOT DETECTED
V CHOLERAE DNA STL QL NAA+NON-PROBE: NOT DETECTED
WBC MORPH BLD: NORMAL
WBC NRBC COR # BLD: 5.87 10*3/MM3 (ref 3.4–10.8)
Y ENTEROCOL DNA STL QL NAA+NON-PROBE: NOT DETECTED

## 2023-08-01 PROCEDURE — 85025 COMPLETE CBC W/AUTO DIFF WBC: CPT | Performed by: INTERNAL MEDICINE

## 2023-08-01 PROCEDURE — 80053 COMPREHEN METABOLIC PANEL: CPT | Performed by: INTERNAL MEDICINE

## 2023-08-01 PROCEDURE — G0378 HOSPITAL OBSERVATION PER HR: HCPCS

## 2023-08-01 PROCEDURE — 99214 OFFICE O/P EST MOD 30 MIN: CPT | Performed by: STUDENT IN AN ORGANIZED HEALTH CARE EDUCATION/TRAINING PROGRAM

## 2023-08-01 PROCEDURE — 87507 IADNA-DNA/RNA PROBE TQ 12-25: CPT | Performed by: HOSPITALIST

## 2023-08-01 PROCEDURE — 85007 BL SMEAR W/DIFF WBC COUNT: CPT | Performed by: INTERNAL MEDICINE

## 2023-08-01 PROCEDURE — 84145 PROCALCITONIN (PCT): CPT | Performed by: HOSPITALIST

## 2023-08-01 PROCEDURE — 96361 HYDRATE IV INFUSION ADD-ON: CPT

## 2023-08-01 PROCEDURE — 25010000002 SODIUM CHLORIDE 0.9 % WITH KCL 20 MEQ 20-0.9 MEQ/L-% SOLUTION: Performed by: HOSPITALIST

## 2023-08-01 RX ORDER — LIDOCAINE 50 MG/G
1 PATCH TOPICAL
Status: DISCONTINUED | OUTPATIENT
Start: 2023-08-01 | End: 2023-08-01 | Stop reason: HOSPADM

## 2023-08-01 RX ORDER — LOPERAMIDE HYDROCHLORIDE 2 MG/1
2 CAPSULE ORAL 4 TIMES DAILY PRN
Status: DISCONTINUED | OUTPATIENT
Start: 2023-08-01 | End: 2023-08-01 | Stop reason: HOSPADM

## 2023-08-01 RX ORDER — ATORVASTATIN CALCIUM 40 MG/1
40 TABLET, FILM COATED ORAL DAILY
Qty: 90 TABLET
Start: 2023-08-01

## 2023-08-01 RX ORDER — LOPERAMIDE HYDROCHLORIDE 2 MG/1
2 CAPSULE ORAL 4 TIMES DAILY PRN
Qty: 30 CAPSULE | Refills: 0 | Status: SHIPPED | OUTPATIENT
Start: 2023-08-01

## 2023-08-01 RX ORDER — CEFDINIR 300 MG/1
300 CAPSULE ORAL EVERY 12 HOURS SCHEDULED
Status: DISCONTINUED | OUTPATIENT
Start: 2023-08-01 | End: 2023-08-01 | Stop reason: HOSPADM

## 2023-08-01 RX ORDER — CEFDINIR 300 MG/1
300 CAPSULE ORAL EVERY 12 HOURS SCHEDULED
Qty: 28 CAPSULE | Refills: 0 | Status: SHIPPED | OUTPATIENT
Start: 2023-08-01 | End: 2023-08-15

## 2023-08-01 RX ADMIN — LOSARTAN POTASSIUM 100 MG: 50 TABLET, FILM COATED ORAL at 09:29

## 2023-08-01 RX ADMIN — POTASSIUM CHLORIDE AND SODIUM CHLORIDE 125 ML/HR: 900; 150 INJECTION, SOLUTION INTRAVENOUS at 06:57

## 2023-08-01 RX ADMIN — LOPERAMIDE HYDROCHLORIDE 2 MG: 2 CAPSULE ORAL at 09:35

## 2023-08-01 RX ADMIN — PANTOPRAZOLE SODIUM 40 MG: 40 TABLET, DELAYED RELEASE ORAL at 06:29

## 2023-08-01 RX ADMIN — LIDOCAINE 1 PATCH: 50 PATCH TOPICAL at 09:35

## 2023-08-01 RX ADMIN — IBUPROFEN 400 MG: 400 TABLET, FILM COATED ORAL at 09:35

## 2023-08-01 RX ADMIN — CEFDINIR 300 MG: 300 CAPSULE ORAL at 09:30

## 2023-08-01 NOTE — PROGRESS NOTES
SERVICE: [facility] HOSPITALIST    CONSULTANTS: Gastroenterology    CHIEF COMPLAINT: Follow-up transaminitis    SUBJECTIVE: Patient seen and examined at bedside. Patient reports loose bowel movements persisting since 3 AM this morning.  She reports she had severe abdominal pain on arrival, and remains concerned about this.  Her daughter myself and the patient discussed her care up to this point.  Informed her of concern for common bile duct stone which was ruled out via MRCP.  She continues to be evaluated by GI.  Due to her diarrhea we are checking a GI PCR, she has been initiated on cefdinir due to E. coli bacteremia, we do not know where the source of E. coli bacteremia has come from but are suspicious that may be her GI infection has precipitated this.  Patient and daughter are agreeable to talking with Dr. Worley again this afternoon.  Patient would like to be discharged home when possible but would like additional answers as well.  No other acute issues or complaints.     OBJECTIVE:    Physical exam is largely unchanged from previous exam, except where documented below, examination is accurate as of 8/1/2023    Physical Exam:  General: Patient is awake and alert.  Obese elderly female. No acute distress noted.   HENT: Head is atraumatic, normocephalic. Hearing is grossly intact. Nose is without obvious congestion and appears patent. Neck is supple and trachea is midline.   Eyes: Vision is grossly intact. Pupils appear equal and round.   Cardiovascular: Heart has regular rate and rhythm with no murmurs, rubs or gallops noted.   Respiratory: Lungs are clear to ausculation without wheezes, rhonchi or rales.   Abdominal/GI: Soft, nontender, bowel sounds present. No rebound or guarding present.   Extremities: No peripheral edema noted.   Musculoskeletal: Spontaneous movement of bilateral upper and lower extremities against gravity noted. No signs of injury or deformity noted.   Skin: Warm and  "dry.   Psych: Mood and affect are appropriate. Cooperative with exam.   Neuro: No facial asymmetry noted. No focal deficits noted, hearing and vision are grossly intact.     /70 (BP Location: Left arm, Patient Position: Lying)   Pulse 76   Temp 98.6 øF (37 øC) (Oral)   Resp 16   Ht 157.5 cm (62\")   Wt 74.9 kg (165 lb 3.2 oz)   SpO2 97%   BMI 30.22 kg/mý     MEDS/LABS REVIEWED AND ORDERED    cefdinir, 300 mg, Oral, Q12H  cetirizine, 10 mg, Oral, Daily  losartan, 100 mg, Oral, Q24H  pantoprazole, 40 mg, Oral, Q AM          LAB/DIAGNOSTICS:    Lab Results (last 24 hours)       Procedure Component Value Units Date/Time    CBC & Differential [[id number]]  (Abnormal) Collected: 08/01/23 0547    Specimen: Blood Updated: 08/01/23 0721    Narrative:      The following orders were created for panel order CBC & Differential.  Procedure                               Abnormality         Status                     ---------                               -----------         ------                     CBC Auto Differential[[id number]]        Abnormal            Final result               Scan Slide[[id number]]                                       In process                   Please view results for these tests on the individual orders.    CBC Auto Differential [[id number]]  (Abnormal) Collected: 08/01/23 0547    Specimen: Blood Updated: 08/01/23 0721     WBC 5.87 10*3/mm3      RBC 4.12 10*6/mm3      Hemoglobin 12.6 g/dL      Hematocrit 39.1 %      MCV 94.9 fL      MCH 30.6 pg      MCHC 32.2 g/dL      RDW 14.1 %      RDW-SD 49.0 fl      MPV 11.4 fL      Platelets 159 10*3/mm3      Neutrophil % 70.8 %      Lymphocyte % 10.6 %      Monocyte % 14.7 %      Eosinophil % 3.4 %      Basophil % 0.3 %      Immature Grans % 0.2 %      Neutrophils, Absolute 4.16 10*3/mm3      Lymphocytes, Absolute 0.62 10*3/mm3      Monocytes, Absolute 0.86 10*3/mm3      Eosinophils, Absolute 0.20 10*3/mm3      Basophils, Absolute 0.02 10*3/mm3      " Immature Grans, Absolute 0.01 10*3/mm3      nRBC 0.0 /100 WBC     Scan Slide [[id number]] Collected: 08/01/23 0547    Specimen: Blood Updated: 08/01/23 0715    Procalcitonin [[id number]] Collected: 08/01/23 0547    Specimen: Blood Updated: 08/01/23 0705    Comprehensive Metabolic Panel [[id number]]  (Abnormal) Collected: 08/01/23 0547    Specimen: Blood Updated: 08/01/23 0612     Glucose 100 mg/dL      BUN 9 mg/dL      Creatinine 0.71 mg/dL      Sodium 139 mmol/L      Potassium 3.6 mmol/L      Chloride 105 mmol/L      CO2 23.1 mmol/L      Calcium 8.9 mg/dL      Total Protein 6.3 g/dL      Albumin 3.6 g/dL      ALT (SGPT) 220 U/L      AST (SGOT) 130 U/L      Alkaline Phosphatase 242 U/L      Total Bilirubin 3.3 mg/dL      Globulin 2.7 gm/dL      A/G Ratio 1.3 g/dL      BUN/Creatinine Ratio 12.7     Anion Gap 10.9 mmol/L      eGFR 91.6 mL/min/1.73     Narrative:      GFR Normal >60  Chronic Kidney Disease <60  Kidney Failure <15      Blood Culture ID, PCR - Blood, Arm, Left [[id number]]  (Abnormal) Collected: 07/30/23 1931    Specimen: Blood from Arm, Left Updated: 08/01/23 0611     BCID, PCR Escherichia coli. Identification by BCID2 PCR.     BOTTLE TYPE Aerobic Bottle    Narrative:      No resistance genes detected.    CMV IgG IgM [[id number]]  (Abnormal) Collected: 07/31/23 0350    Specimen: Blood Updated: 08/01/23 0508     CMV IgG 5.90 U/mL      Comment:                                Negative          <0.60                                 Equivocal   0.60 - 0.69                                 Positive          >0.69        CMV IgM <30.0 AU/mL      Comment:                                 Negative         <30.0                                  Equivocal  30.0 - 34.9                                  Positive         >34.9  A positive result is generally indicative of acute  infection, reactivation or persistent IgM production.       Narrative:      Performed at:  [address] [place] [address]   [id number]  : Hola Reyes PhD, Phone:  [phone]    Hepatitis Panel, Acute [[id number]]  (Normal) Collected: 07/30/23 2246    Specimen: Blood Updated: 07/31/23 1843     Hepatitis B Surface Ag Non-Reactive     Hep A IgM Non-Reactive     Hep B C IgM Non-Reactive     Hepatitis C Ab Non-Reactive    Narrative:      Results may be falsely decreased if patient taking Biotin.     Blood Culture - Blood, Hand, Right [[id number]]  (Abnormal) Collected: 07/30/23 1932    Specimen: Blood from Hand, Right Updated: 07/31/23 1835     Blood Culture Abnormal Stain     Gram Stain Aerobic Bottle Gram negative bacilli    Blood Culture - Blood, Arm, Left [[id number]]  (Abnormal) Collected: 07/30/23 1931    Specimen: Blood from Arm, Left Updated: 07/31/23 1824     Blood Culture Abnormal Stain     Gram Stain Anaerobic Bottle Gram negative bacilli      Aerobic Bottle Gram negative bacilli    IgG, IgA, IgM [[id number]]  (Abnormal) Collected: 07/31/23 0350    Specimen: Blood Updated: 07/31/23 1216     IgG 688 mg/dL      IgM 54 mg/dL      IgA 277 mg/dL     Ceruloplasmin [[id number]]  (Normal) Collected: 07/31/23 0623    Specimen: Blood Updated: 07/31/23 1047     Ceruloplasmin 23 mg/dL     Gamma GT [[id number]]  (Abnormal) Collected: 07/30/23 1931    Specimen: Blood Updated: 07/31/23 1046      U/L     Lactate Dehydrogenase [[id number]]  (Abnormal) Collected: 07/31/23 0623    Specimen: Blood Updated: 07/31/23 0800      U/L     Lipase [[id number]]  (Normal) Collected: 07/31/23 0623    Specimen: Blood Updated: 07/31/23 0800     Lipase 13 U/L     Ferritin [[id number]]  (Abnormal) Collected: 07/31/23 0623    Specimen: Blood Updated: 07/31/23 0800     Ferritin 440.00 ng/mL     Narrative:      Results may be falsely decreased if patient taking Biotin.      Mitochondrial Antibodies, M2 [[id number]] Collected: 07/31/23 0350    Specimen: Blood Updated: 07/31/23 0750    Anti-Smooth Muscle Antibody Titer [[id number]] Collected: 07/31/23  0350    Specimen: Blood Updated: 07/31/23 0750    Celiac Comprehensive Panel [[id number]] Collected: 07/31/23 0350    Specimen: Blood Updated: 07/31/23 0750          ECG 12 Lead Other; epigastric pain   Final Result   HEART RATE= 85  bpm   RR Interval= 704  ms   CA Interval= 161  ms   P Horizontal Axis= -8  deg   P Front Axis= 39  deg   QRSD Interval= 81  ms   QT Interval= 357  ms   QRS Axis= 5  deg   T Wave Axis= 17  deg   - NORMAL ECG -   Sinus rhythm   When compared with ECG of 29-Jul-2023 19:30:00,   No significant change    Electronically Signed By: Matthew Reynolds ([facility]) 31-Jul-2023 07:42:19   Date and Time of Study: 2023-07-30 20:03:40          CT Abdomen Pelvis With Contrast    Result Date: 7/30/2023  1. Post cholecystectomy. No evidence for fluid collection in the postoperative bed. There is mild periportal edema that is new from the prior study. Please correlate for clinical evidence of hepatitis or liver dysfunction. There may be mild intrahepatic biliary ductal dilatation but the common bile duct doesn't appear to be dilated. 2. Occasional sigmoid diverticula without evidence of diverticulitis. No bowel obstruction or free air. 3. Postoperative changes to the spine   This report was finalized on 7/30/2023 9:55 PM by Dr. Roya Mcintosh MD.      US Liver    Result Date: 7/31/2023  Negative liver ultrasound  This report was finalized on 7/31/2023 8:55 AM by Dr. Denilson Arzate MD.      MRI abdomen w wo contrast mrcp    Result Date: 7/31/2023  1.  Normal MRCP post cholecystectomy. No bile duct dilatation. No evidence of choledocholithiasis. No evidence of cholangitis or acute pancreatitis. 2.  Liver is normal in MRI appearance. 3.  Benign bilateral parapelvic renal cysts.  This report was finalized on 7/31/2023 10:21 PM by Dr. Santiago Dominguez MD.         ASSESSMENT/PLAN:  Please note portions of this assessment/plan may have been copied and pasted, but I have personally seen this patient and reviewed each line of  "this assessment and plan for accuracy and made updates to reflect my necessary changes on 8/1/2023    Transaminitis of unclear etiology with associated diarrhea  -GI PCR pending  -CMV EBV panel pending  -CT abdomen showed possible enlarged common bile duct therefore GI ordered MRCP which is negative  -Patient complaining of diarrhea and loss of appetite, GI continues to follow  -Liver ultrasound was negative.   -We will provide as needed Imodium    E. coli bacteremia  -No source identified, 1 of 2 blood cultures positive.  -Patient placed on cefdinir, will await further characterization on blood culture sensitivity.  Likely will need to repeat blood cultures.   -Repeat CBC pending as well as procalcitonin.  Initially had no leukocytosis and procalcitonin was negative.     Essential hypertension-continue losartan.  Holding hydrochlorothiazide.     Eosinophilic esophagitis-continue PPI therapy.     Obesity-BMI is 30.22 kg per metered squared.  Plan for nutrition evaluation today.       PLAN FOR DISPOSITION: STEPHEN Soriano DO  Hospitalist, [facility]  08/01/23  07:21 EDT    At [facility], we believe that sharing information builds trust and better relationships. You are receiving this note because you recently visited [facility]. It is possible you will see health information before a provider has talked with you about it. This kind of information can be easy to misunderstand. To help you fully understand what it means for your health, we urge you to discuss this note with your provider.    \"Dictated utilizing Dragon dictation\"    "

## 2023-08-01 NOTE — DISCHARGE INSTR - APPOINTMENTS
Patient needs to call and make a hospital follow up with Dr. Wylie within 1-2 weeks of discharge. [phone]

## 2023-08-01 NOTE — CASE MANAGEMENT/SOCIAL WORK
Case Management Discharge Note      Final Note: Discharged home.         Selected Continued Care - Discharged on 8/1/2023 Admission date: 7/30/2023 - Discharge disposition: Home or Self Care      Destination    No services have been selected for the patient.                Durable Medical Equipment    No services have been selected for the patient.                Dialysis/Infusion    No services have been selected for the patient.                Home Medical Care    No services have been selected for the patient.                Therapy    No services have been selected for the patient.                Community Resources    No services have been selected for the patient.                Community & DME    No services have been selected for the patient.                         Final Discharge Disposition Code: 01 - home or self-care

## 2023-08-01 NOTE — CONSULTS
Adult Nutrition  Assessment/PES    Patient Name:  Rochelle Montejo  YOB: 1950  MRN: [id number]  Admit Date:  7/30/2023    Assessment Date:  8/1/2023    Comments:  Encourage po and voice food prefs. Menu provided to pt and family.  Will cont to follow and remain available.      Reason for Assessment       Row Name 08/01/23 1209          Reason for Assessment    Reason For Assessment physician consult     Diagnosis liver disease;gastrointestinal disease  transaminitis, ecoli bacteremia, diarrhea overnight hx recent lap ccy                    Nutrition/Diet History       Row Name 08/01/23 1209          Nutrition/Diet History    Typical Intake (Food/Fluid/EN/PN) pt up in chair with family at bedside.NKFA. reports she hopes for home today. pt happy to get menu brochure and will pick out something for dinner tonight. ( she had refused to select for lunch)                    Labs/Tests/Procedures/Meds       Row Name 08/01/23 1210          Labs/Procedures/Meds    Lab Results Reviewed reviewed     Lab Results Comments  H, tbili 3.3 H        Diagnostic Tests/Procedures    Diagnostic Test/Procedure Reviewed reviewed        Medications    Pertinent Medications Reviewed reviewed                      Estimated/Assessed Needs - Anthropometrics       Row Name 08/01/23 1210          Anthropometrics    Weight for Calculation 74.9 kg (165 lb 3.2 oz)        Estimated/Assessed Needs    Additional Documentation Estimated Calorie Needs (Group);Fluid Requirements (Group);Protein Requirements (Group)        Estimated Calorie Needs    Estimated Calorie Requirement (kcal/day) 1647 kcal ( 22 kcal/kg obese BMI)     Estimated Calorie Need Method kcal/kg        Protein Requirements    Est Protein Requirement Amount (gms/kg) 1.0 gm protein  75 gm pro        Fluid Requirements    Estimated Fluid Requirement Method RDA Method  1647 ml                    Nutrition Prescription Ordered       Row Name 08/01/23 1211           Nutrition Prescription PO    Common Modifiers Cardiac                    Evaluation of Received Nutrient/Fluid Intake       Row Name 08/01/23 1211          Fluid Intake Evaluation    Oral Fluid (mL) 168  insufficient data on clears        PO Evaluation    % PO Intake 50% ave x 2 meals on clears recorded                       Problem/Interventions:   Problem 1       Row Name 08/01/23 1213          Nutrition Diagnoses Problem 1    Problem 1 Other (comment)  Inadequate energy intake related to ecoli bacteremia as evidenced by poor appetite, clear liquids, diarrhea.                          Intervention Goal       Row Name 08/01/23 1213          Intervention Goal    General Meet nutritional needs for age/condition     PO Establish PO;PO intake (%)     PO Intake % 50 %  or greater                    Nutrition Intervention       Row Name 08/01/23 1216          Nutrition Intervention    RD/Tech Action Follow Tx progress;Encourage intake;Advise available snack;Advise alternate selection;Menu provided                      Education/Evaluation       Row Name 08/01/23 1216          Education    Education Other (comment)  edu on menu & how it works, alt options. edu have to provide meals even if pt doesn't select after one refusal.        Monitor/Evaluation    Monitor Per protocol;I&O;PO intake;Pertinent labs;Weight;Symptoms     Education Follow-up Other (comment)  pt & family verbalize understanding                     Electronically signed by:  Beth Dickey RD  08/01/23 12:17 EDT

## 2023-08-01 NOTE — DISCHARGE SUMMARY
"Rochelle Montejo  1950  [id number]    Hospitalists Discharge Summary    Date of Admission: 7/30/2023  Date of Discharge:  8/1/2023    History of Present Illness from [facility] on admit: Ms. Montejo is a pleasant, 71 y/o  female who re-presented to the ER w/ continued lower chest/mid-epigastric pain. She initially noticed symptoms on Saturday. She was visiting her daughter at the time and developed chest pain. She was evaluated in the ER that evening w/ a normal work-up. It was felt that she likely had a reflux episode and was discharged. She felt well for the rest of the evening until around 12:30 on Sunday, she went to drink a glass of water when the pain abruptly returned feelling like someone was \"squeezing\" and \"twisting\" her insides. She took a dose of her Zyrtec and BP medicine because they make her sleepy, but this did not resolve the pain. She denies vomiting and diarrhea. She denies skin yellowing and change in stool color. She denies tick exposure. She denies recent medicine change or initiation. No herbal supplements reported. The distribution of the pain is a little different than her presentation back in May when she underwent cholecystectomy. She returned to the ER and LFT's were even higher than the previous evening's results.     Primary Discharge diagnoses: EPEC infection with E. Coli bacteremia-improving.     Secondary Discharge Diagnoses: Acute transaminitis secondary to above.  Essential hypertension.  Eosinophilic esophagitis-stable on home PPI.     Hospital Course Summary: Patient was admitted for abdominal pain with acute transaminitis, CT abdomen and pelvis showed possibly dilated common bile duct after patient's previous cholecystectomy, GI followed patient and performed MRCP which showed no common bile duct stones.  Patient then developed diarrhea on hospital night two, GI PCR panel was checked, and showed patient to have EPEC infection, she also had blood cultures positive for " E. Coli, sensitivity report has not returned yet but patient was initiated on oral Omnicef for 2 weeks duration.  We will continue to follow sensitivity report closely and I informed patient we will update her if antibiotics need to be changed, she would still like to be discharged at this time and expresses understanding of not having full sensitivity report back at this time.  Patient was advised to utilize active culture yogurt while on antibiotic therapy for 2-week. On 8/1/2023 patient's condition had improved. They were deemed stable for discharge. They were advised to take all medications as prescribed, follow up with PCP within 1 week. If there are any issues patient should contact PCP and/or return to the ED for follow up. Patient was agreeable to the plan and subsequently discharged at this time.     PCP  Patient Care Team:  Korey Wylie MD as PCP - General (Family Medicine)    Consults:   Consults       Date and Time Order Name Status Description    7/31/2023  1:12 AM Inpatient Gastroenterology Consult Completed             Operations and Procedures Performed:       XR Chest 2 View    Result Date: 7/29/2023  Narrative: PA LATERAL CHEST X-RAY     HISTORY: Episode of epigastric pain radiating to the chest for 30 minutes. HISTORY of hypertension and reflux.   TECHNIQUE: PA and lateral views of the chest are reviewed. No comparison.    FINDINGS: There is normal cardiac silhouette size. No acute appearing parenchymal infiltrate or acute congestive heart failure. Moderate thoracic degenerative changes. No pneumothorax.      Impression: No active disease.  This report was finalized on 7/29/2023 7:49 PM by Dr. Roya Mcintosh MD.      CT Abdomen Pelvis With Contrast    Result Date: 7/30/2023  Narrative: ABDOMEN PELVIS CT WITH CONTRAST  HISTORY: Cholecystectomy 2 months ago. Epigastric pain today with abnormal liver function tests.  TECHNIQUE: CT abdomen pelvis performed during the intravenous ministration of  contrast. COMPARISON study is from 5/13/2023 prior to the cholecystectomy. Sagittal and coronal reconstructed imaging obtained. Oral contrast media also given. Radiation dose reduction techniques included automated exposure control or exposure modulation based on body size. Radiation audit for CT and nuclear cardiology exams in the last 12 months: 1.  ABDOMEN FINDINGS: Lung bases: There is dependent atelectasis.  There our postoperative changes of cholecystectomy. There is no fluid collection in the postoperative bed. There is mild periportal edema that is new from the previous study. Please correlate for clinical evidence of hepatitis/ Liver dysfunction. There is no definite enlargement of the common bile duct. There may be mild intrahepatic biliary ductal dilatation in addition to the periportal edema. The pancreas, adrenal glands, spleen, are unremarkable.   There is streak artifact from spine hardware. There are probably bilateral parapelvic renal cysts and a tiny right lower pole nonobstructing renal calculus. No convincing evidence for hydronephrosis. Appearance of the kidneys is similar to prior.  There are atherosclerotic vascular calcifications involving the abdominal aorta and branch vessels. No retroperitoneal lymphadenopathy is suspected.  There is sigmoid diverticulosis. There is no CT evidence for diverticulitis. There is no evidence for bowel obstruction or free air. The appendix is unremarkable.  PELVIS FINDINGS: Bladder is unremarkable. Uterus and adnexal structures are within normal limits.  There are degenerative changes in the postoperative spine. There doesn't appear to be fusion across the L3-4 intervertebral disc spacer.      Impression: 1. Post cholecystectomy. No evidence for fluid collection in the postoperative bed. There is mild periportal edema that is new from the prior study. Please correlate for clinical evidence of hepatitis or liver dysfunction. There may be mild intrahepatic  biliary ductal dilatation but the common bile duct doesn't appear to be dilated. 2. Occasional sigmoid diverticula without evidence of diverticulitis. No bowel obstruction or free air. 3. Postoperative changes to the spine   This report was finalized on 7/30/2023 9:55 PM by Dr. Roya Mcintosh MD.      US Liver    Result Date: 7/31/2023  Narrative: EXAM: Liver ultrasound  INDICATION: Cholecystectomy 2 months ago abdominal pain for 3 days and elevated liver enzymes.  FINDINGS: The visualized portions of the pancreas are normal. The liver is normal in echogenicity. No fluid collections or masses are visible. Gallbladder has been removed portal vein shows flow into the liver. Common bile duct is 4 mm in diameter. Right kidney is 10 cm in length. Renal pelvis is within normal limits. Patient had a CT scan yesterday which showed mild prominence of each renal pelvis versus parapelvic cysts. Ureters are nondilated.      Impression: Negative liver ultrasound  This report was finalized on 7/31/2023 8:55 AM by Dr. Denilson Arzate MD.      MRI abdomen w wo contrast mrcp    Result Date: 7/31/2023  Narrative: MRI ABDOMEN WITH CONTRAST, INCLUDING MRCP, 7/31/2023  HISTORY: 72-year-old female admitted to the hospital with recurrent upper abdomen pain. Mild transaminitis and elevated serum bilirubin. No bile duct dilatation on imaging. Cholecystectomy in May.  TECHNIQUE: MRI examination of the abdomen before and after gadolinium contrast administration (15 cc MultiHance), including multiphase postcontrast imaging. MRCP sequences were also obtained.  FINDINGS: Prior cholecystectomy. The intrahepatic and extrahepatic bile ducts are normal in caliber and appearance. No evidence of choledocholithiasis or other biliary obstruction. No evidence of cholangitis. The pancreas is normal in appearance, there is no pancreatic duct dilatation.  Liver and spleen are normal in size and MRI appearance. There is no evidence of periportal edema questioned  on admission CT. Hepatic vasculature is widely patent.  No mass, adenopathy or fluid collection within the upper abdomen. Both kidneys are negative with the exception of benign parapelvic renal cysts. Normal caliber abdominal aorta with patent central mesenteric vessels. Normal adrenal glands.      Impression: 1.  Normal MRCP post cholecystectomy. No bile duct dilatation. No evidence of choledocholithiasis. No evidence of cholangitis or acute pancreatitis. 2.  Liver is normal in MRI appearance. 3.  Benign bilateral parapelvic renal cysts.  This report was finalized on 7/31/2023 10:21 PM by Dr. Santiago Dominguez MD.       Allergies:  is allergic to norco [hydrocodone-acetaminophen].    Lit  Reviewed    Discharge Medications:     Discharge Medications        New Medications        Instructions Start Date   cefdinir 300 MG capsule  Commonly known as: OMNICEF   300 mg, Oral, Every 12 Hours Scheduled      loperamide 2 MG capsule  Commonly known as: IMODIUM   2 mg, Oral, 4 Times Daily PRN             Changes to Medications        Instructions Start Date   atorvastatin 40 MG tablet  Commonly known as: LIPITOR  What changed: additional instructions   40 mg, Oral, Daily, Resume this medication on 8/5/23.      fluticasone 50 MCG/ACT nasal spray  Commonly known as: Flonase  What changed:   when to take this  reasons to take this   1 spray each nostril BID             Continue These Medications        Instructions Start Date   cetirizine 10 MG tablet  Commonly known as: zyrTEC   1 tablet, Oral, Daily      ergocalciferol 1.25 MG (36016 UT) capsule  Commonly known as: ERGOCALCIFEROL   50,000 Units, Oral, Every 7 Days      losartan-HCTZ 100-12.5 combo dose   1 dose, Oral, Daily      omeprazole 40 MG capsule  Commonly known as: priLOSEC   1 capsule, Oral, Daily      traMADol 50 MG tablet  Commonly known as: ULTRAM   50 mg, Oral, Every 6 Hours PRN               Last Lab Results:   Lab Results (most recent)       Procedure  Component Value Units Date/Time    Gastrointestinal Panel, PCR - Stool, Per Rectum [[id number]]  (Abnormal) Collected: 08/01/23 0750    Specimen: Stool from Per Rectum Updated: 08/01/23 1213     Campylobacter Not Detected     Plesiomonas shigelloides Not Detected     Salmonella Not Detected     Vibrio Not Detected     Vibrio cholerae Not Detected     Yersinia enterocolitica Not Detected     Enteroaggregative E. coli (EAEC) Not Detected     Enteropathogenic E. coli (EPEC) Detected     Enterotoxigenic E. coli (ETEC) lt/st Not Detected     Shiga-like toxin-producing E. coli (STEC) stx1/stx2 Not Detected     Shigella/Enteroinvasive E. coli (EIEC) Not Detected     Cryptosporidium Not Detected     Cyclospora cayetanensis Not Detected     Entamoeba histolytica Not Detected     Giardia lamblia Not Detected     Adenovirus F40/41 Not Detected     Astrovirus Not Detected     Norovirus GI/GII Not Detected     Rotavirus A Not Detected     Sapovirus (I, II, IV or V) Not Detected    CBC & Differential [[id number]]  (Abnormal) Collected: 08/01/23 0547    Specimen: Blood Updated: 08/01/23 0752    Narrative:      The following orders were created for panel order CBC & Differential.  Procedure                               Abnormality         Status                     ---------                               -----------         ------                     CBC Auto Differential[[id number]]        Abnormal            Final result               Scan Slide[[id number]]                                       Final result                 Please view results for these tests on the individual orders.    Scan Slide [[id number]] Collected: 08/01/23 0547    Specimen: Blood Updated: 08/01/23 0752     RBC Morphology Normal     WBC Morphology Normal     Clumped Platelets Present    Blood Culture - Blood, Hand, Right [[id number]]  (Abnormal) Collected: 07/30/23 1932    Specimen: Blood from Hand, Right Updated: 08/01/23 0752     Blood Culture Escherichia coli  "    Isolated from Aerobic Bottle     Gram Stain Aerobic Bottle Gram negative bacilli    Blood Culture - Blood, Arm, Left [[id number]]  (Abnormal) Collected: 07/30/23 1931    Specimen: Blood from Arm, Left Updated: 08/01/23 0751     Blood Culture Escherichia coli     Isolated from Aerobic and Anaerobic Bottles     Gram Stain Anaerobic Bottle Gram negative bacilli      Aerobic Bottle Gram negative bacilli    Procalcitonin [[id number]]  (Abnormal) Collected: 08/01/23 0547    Specimen: Blood Updated: 08/01/23 0730     Procalcitonin 0.32 ng/mL     Narrative:      As a Marker for Sepsis (Non-Neonates):    1. <0.5 ng/mL represents a low risk of severe sepsis and/or septic shock.  2. >2 ng/mL represents a high risk of severe sepsis and/or septic shock.    As a Marker for Lower Respiratory Tract Infections that require antibiotic therapy:    PCT on Admission    Antibiotic Therapy       6-12 Hrs later    >0.5                Strongly Recommended  >0.25 - <0.5        Recommended   0.1 - 0.25          Discouraged              Remeasure/reassess PCT  <0.1                Strongly Discouraged     Remeasure/reassess PCT    As 28 day mortality risk marker: \"Change in Procalcitonin Result\" (>80% or <=80%) if Day 0 (or Day 1) and Day 4 values are available. Refer to http://www.[facility]-pct-calculator.com    Change in PCT <=80%  A decrease of PCT levels below or equal to 80% defines a positive change in PCT test result representing a higher risk for 28-day all-cause mortality of patients diagnosed with severe sepsis for septic shock.    Change in PCT >80%  A decrease of PCT levels of more than 80% defines a negative change in PCT result representing a lower risk for 28-day all-cause mortality of patients diagnosed with severe sepsis or septic shock.       CBC Auto Differential [[id number]]  (Abnormal) Collected: 08/01/23 0547    Specimen: Blood Updated: 08/01/23 0721     WBC 5.87 10*3/mm3      RBC 4.12 10*6/mm3      Hemoglobin 12.6 g/dL      " Hematocrit 39.1 %      MCV 94.9 fL      MCH 30.6 pg      MCHC 32.2 g/dL      RDW 14.1 %      RDW-SD 49.0 fl      MPV 11.4 fL      Platelets 159 10*3/mm3      Neutrophil % 70.8 %      Lymphocyte % 10.6 %      Monocyte % 14.7 %      Eosinophil % 3.4 %      Basophil % 0.3 %      Immature Grans % 0.2 %      Neutrophils, Absolute 4.16 10*3/mm3      Lymphocytes, Absolute 0.62 10*3/mm3      Monocytes, Absolute 0.86 10*3/mm3      Eosinophils, Absolute 0.20 10*3/mm3      Basophils, Absolute 0.02 10*3/mm3      Immature Grans, Absolute 0.01 10*3/mm3      nRBC 0.0 /100 WBC     Comprehensive Metabolic Panel [[id number]]  (Abnormal) Collected: 08/01/23 0547    Specimen: Blood Updated: 08/01/23 0612     Glucose 100 mg/dL      BUN 9 mg/dL      Creatinine 0.71 mg/dL      Sodium 139 mmol/L      Potassium 3.6 mmol/L      Chloride 105 mmol/L      CO2 23.1 mmol/L      Calcium 8.9 mg/dL      Total Protein 6.3 g/dL      Albumin 3.6 g/dL      ALT (SGPT) 220 U/L      AST (SGOT) 130 U/L      Alkaline Phosphatase 242 U/L      Total Bilirubin 3.3 mg/dL      Globulin 2.7 gm/dL      A/G Ratio 1.3 g/dL      BUN/Creatinine Ratio 12.7     Anion Gap 10.9 mmol/L      eGFR 91.6 mL/min/1.73     Narrative:      GFR Normal >60  Chronic Kidney Disease <60  Kidney Failure <15      Blood Culture ID, PCR - Blood, Arm, Left [[id number]]  (Abnormal) Collected: 07/30/23 1931    Specimen: Blood from Arm, Left Updated: 08/01/23 0611     BCID, PCR Escherichia coli. Identification by BCID2 PCR.     BOTTLE TYPE Aerobic Bottle    Narrative:      No resistance genes detected.    CMV IgG IgM [[id number]]  (Abnormal) Collected: 07/31/23 0350    Specimen: Blood Updated: 08/01/23 0508     CMV IgG 5.90 U/mL      Comment:                                Negative          <0.60                                 Equivocal   0.60 - 0.69                                 Positive          >0.69        CMV IgM <30.0 AU/mL      Comment:                                 Negative          <30.0                                  Equivocal  30.0 - 34.9                                  Positive         >34.9  A positive result is generally indicative of acute  infection, reactivation or persistent IgM production.       Narrative:      Performed at:  [facility]  [id number]  : Hola Reyes PhD, Phone:  [phone]    Hepatitis Panel, Acute [[id number]]  (Normal) Collected: 07/30/23 2246    Specimen: Blood Updated: 07/31/23 1843     Hepatitis B Surface Ag Non-Reactive     Hep A IgM Non-Reactive     Hep B C IgM Non-Reactive     Hepatitis C Ab Non-Reactive    Narrative:      Results may be falsely decreased if patient taking Biotin.     IgG, IgA, IgM [[id number]]  (Abnormal) Collected: 07/31/23 0350    Specimen: Blood Updated: 07/31/23 1216     IgG 688 mg/dL      IgM 54 mg/dL      IgA 277 mg/dL     Ceruloplasmin [[id number]]  (Normal) Collected: 07/31/23 0623    Specimen: Blood Updated: 07/31/23 1047     Ceruloplasmin 23 mg/dL     Gamma GT [[id number]]  (Abnormal) Collected: 07/30/23 1931    Specimen: Blood Updated: 07/31/23 1046      U/L     Lactate Dehydrogenase [[id number]]  (Abnormal) Collected: 07/31/23 0623    Specimen: Blood Updated: 07/31/23 0800      U/L     Lipase [[id number]]  (Normal) Collected: 07/31/23 0623    Specimen: Blood Updated: 07/31/23 0800     Lipase 13 U/L     Ferritin [[id number]]  (Abnormal) Collected: 07/31/23 0623    Specimen: Blood Updated: 07/31/23 0800     Ferritin 440.00 ng/mL     Narrative:      Results may be falsely decreased if patient taking Biotin.      Mitochondrial Antibodies, M2 [[id number]] Collected: 07/31/23 0350    Specimen: Blood Updated: 07/31/23 0750    Anti-Smooth Muscle Antibody Titer [[id number]] Collected: 07/31/23 0350    Specimen: Blood Updated: 07/31/23 0750    Celiac Comprehensive Panel [[id number]] Collected: 07/31/23 0350    Specimen: Blood Updated: 07/31/23 0750    High Sensitivity Troponin T  "2Hr [[id number]]  (Abnormal) Collected: 07/31/23 0623    Specimen: Blood Updated: 07/31/23 0644     HS Troponin T 15 ng/L      Troponin T Delta 3 ng/L     Narrative:      High Sensitive Troponin T Reference Range:  <10.0 ng/L- Negative Female for AMI  <15.0 ng/L- Negative Male for AMI  >=10 - Abnormal Female indicating possible myocardial injury.  >=15 - Abnormal Male indicating possible myocardial injury.   Clinicians would have to utilize clinical acumen, EKG, Troponin, and serial changes to determine if it is an Acute Myocardial Infarction or myocardial injury due to an underlying chronic condition.         Procalcitonin [[id number]]  (Abnormal) Collected: 07/31/23 0350    Specimen: Blood Updated: 07/31/23 0512     Procalcitonin 0.65 ng/mL     Narrative:      As a Marker for Sepsis (Non-Neonates):    1. <0.5 ng/mL represents a low risk of severe sepsis and/or septic shock.  2. >2 ng/mL represents a high risk of severe sepsis and/or septic shock.    As a Marker for Lower Respiratory Tract Infections that require antibiotic therapy:    PCT on Admission    Antibiotic Therapy       6-12 Hrs later    >0.5                Strongly Recommended  >0.25 - <0.5        Recommended   0.1 - 0.25          Discouraged              Remeasure/reassess PCT  <0.1                Strongly Discouraged     Remeasure/reassess PCT    As 28 day mortality risk marker: \"Change in Procalcitonin Result\" (>80% or <=80%) if Day 0 (or Day 1) and Day 4 values are available. Refer to http://www.[employer]-pct-calculator.com    Change in PCT <=80%  A decrease of PCT levels below or equal to 80% defines a positive change in PCT test result representing a higher risk for 28-day all-cause mortality of patients diagnosed with severe sepsis for septic shock.    Change in PCT >80%  A decrease of PCT levels of more than 80% defines a negative change in PCT result representing a lower risk for 28-day all-cause mortality of patients diagnosed with severe sepsis or " septic shock.       High Sensitivity Troponin T [[id number]]  (Abnormal) Collected: 07/31/23 0350    Specimen: Blood Updated: 07/31/23 0512     HS Troponin T 12 ng/L     Narrative:      High Sensitive Troponin T Reference Range:  <10.0 ng/L- Negative Female for AMI  <15.0 ng/L- Negative Male for AMI  >=10 - Abnormal Female indicating possible myocardial injury.  >=15 - Abnormal Male indicating possible myocardial injury.   Clinicians would have to utilize clinical acumen, EKG, Troponin, and serial changes to determine if it is an Acute Myocardial Infarction or myocardial injury due to an underlying chronic condition.         Comprehensive Metabolic Panel [[id number]]  (Abnormal) Collected: 07/31/23 0350    Specimen: Blood Updated: 07/31/23 0510     Glucose 90 mg/dL      BUN 12 mg/dL      Creatinine 0.82 mg/dL      Sodium 134 mmol/L      Potassium 4.0 mmol/L      Comment: Slight hemolysis detected by analyzer. Results may be affected.        Chloride 99 mmol/L      CO2 24.6 mmol/L      Calcium 8.7 mg/dL      Total Protein 5.7 g/dL      Albumin 3.3 g/dL      ALT (SGPT) 311 U/L      AST (SGOT) 295 U/L      Alkaline Phosphatase 226 U/L      Total Bilirubin 3.1 mg/dL      Globulin 2.4 gm/dL      A/G Ratio 1.4 g/dL      BUN/Creatinine Ratio 14.6     Anion Gap 10.4 mmol/L      eGFR 77.1 mL/min/1.73     Narrative:      GFR Normal >60  Chronic Kidney Disease <60  Kidney Failure <15      AUDREY Comprehensive Panel [[id number]] Collected: 07/31/23 0350    Specimen: Blood Updated: 07/31/23 0427    EBV Antibody Profile [[id number]] Collected: 07/31/23 0350    Specimen: Blood Updated: 07/31/23 0427    Protime-INR [[id number]]  (Normal) Collected: 07/30/23 1931    Specimen: Blood Updated: 07/30/23 2251     Protime 12.6 Seconds      INR 0.94    Narrative:      Therapeutic Ranges for INR: 2.0-3.0 (PT 20-30)                              2.5-3.5 (PT 25-34)    aPTT [[id number]]  (Normal) Collected: 07/30/23 1931    Specimen: Blood Updated:  07/30/23 2251     PTT 25.6 seconds     Narrative:      PTT = The equivalent PTT values for the therapeutic range of heparin levels at 0.1 to 0.7 U/ml are 53 to 110 seconds.      Mononucleosis Screen [[id number]]  (Normal) Collected: 07/30/23 1931    Specimen: Blood Updated: 07/30/23 2246     Monospot Negative    Urinalysis, Microscopic Only - Urine, Clean Catch [[id number]]  (Abnormal) Collected: 07/30/23 2015    Specimen: Urine, Clean Catch Updated: 07/30/23 2054     RBC, UA 0-2 /HPF      WBC, UA 3-5 /HPF      Bacteria, UA 1+ /HPF      Squamous Epithelial Cells, UA 3-6 /HPF      Hyaline Casts, UA None Seen /LPF      Amorphous Crystals, UA Moderate/2+ /HPF      Methodology Manual Light Microscopy    Urinalysis With Microscopic If Indicated (No Culture) - Urine, Clean Catch [[id number]]  (Abnormal) Collected: 07/30/23 2015    Specimen: Urine, Clean Catch Updated: 07/30/23 2040     Color, UA Dark Yellow     Appearance, UA Cloudy     pH, UA 8.5     Specific Gravity, UA 1.020     Glucose, UA Negative     Ketones, UA 40 mg/dL (2+)     Bilirubin, UA Small (1+)     Blood, UA Negative     Protein, UA 30 mg/dL (1+)     Leuk Esterase, UA Trace     Nitrite, UA Negative     Urobilinogen, UA 2.0 E.U./dL    Magnesium [[id number]]  (Normal) Collected: 07/30/23 1931    Specimen: Blood Updated: 07/30/23 2005     Magnesium 2.0 mg/dL     Single High Sensitivity Troponin T [[id number]]  (Abnormal) Collected: 07/30/23 1931    Specimen: Blood Updated: 07/30/23 2001     HS Troponin T 11 ng/L     Narrative:      High Sensitive Troponin T Reference Range:  <10.0 ng/L- Negative Female for AMI  <15.0 ng/L- Negative Male for AMI  >=10 - Abnormal Female indicating possible myocardial injury.  >=15 - Abnormal Male indicating possible myocardial injury.   Clinicians would have to utilize clinical acumen, EKG, Troponin, and serial changes to determine if it is an Acute Myocardial Infarction or myocardial injury due to an underlying chronic  condition.         Lipase [[id number]]  (Normal) Collected: 07/30/23 1931    Specimen: Blood Updated: 07/30/23 1959     Lipase 17 U/L     Lactic Acid, Plasma [[id number]]  (Normal) Collected: 07/30/23 1931    Specimen: Blood Updated: 07/30/23 1953     Lactate 1.0 mmol/L     CBC & Differential [[id number]]  (Normal) Collected: 07/30/23 1931    Specimen: Blood Updated: 07/30/23 1935    Narrative:      The following orders were created for panel order CBC & Differential.  Procedure                               Abnormality         Status                     ---------                               -----------         ------                     CBC Auto Differential[[id number]]        Normal              Final result                 Please view results for these tests on the individual orders.    CBC Auto Differential [[id number]]  (Normal) Collected: 07/30/23 1931    Specimen: Blood Updated: 07/30/23 1935     WBC 5.77 10*3/mm3      RBC 4.32 10*6/mm3      Hemoglobin 13.3 g/dL      Hematocrit 39.6 %      MCV 91.7 fL      MCH 30.8 pg      MCHC 33.6 g/dL      RDW 13.5 %      RDW-SD 46.0 fl      MPV 10.6 fL      Platelets 202 10*3/mm3      Neutrophil % 63.2 %      Lymphocyte % 22.2 %      Monocyte % 11.3 %      Eosinophil % 2.6 %      Basophil % 0.5 %      Immature Grans % 0.2 %      Neutrophils, Absolute 3.65 10*3/mm3      Lymphocytes, Absolute 1.28 10*3/mm3      Monocytes, Absolute 0.65 10*3/mm3      Eosinophils, Absolute 0.15 10*3/mm3      Basophils, Absolute 0.03 10*3/mm3      Immature Grans, Absolute 0.01 10*3/mm3      nRBC 0.0 /100 WBC           Imaging Results (Most Recent)       Procedure Component Value Units Date/Time    MRI abdomen w wo contrast mrcp [[id number]] Collected: 07/31/23 2117     Updated: 07/31/23 2224    Narrative:      MRI ABDOMEN WITH CONTRAST, INCLUDING MRCP, 7/31/2023     HISTORY:  72-year-old female admitted to the hospital with recurrent upper abdomen  pain. Mild transaminitis and elevated serum  bilirubin. No bile duct  dilatation on imaging. Cholecystectomy in May.     TECHNIQUE:  MRI examination of the abdomen before and after gadolinium contrast  administration (15 cc MultiHance), including multiphase postcontrast  imaging. MRCP sequences were also obtained.     FINDINGS:  Prior cholecystectomy. The intrahepatic and extrahepatic bile ducts are  normal in caliber and appearance. No evidence of choledocholithiasis or  other biliary obstruction. No evidence of cholangitis. The pancreas is  normal in appearance, there is no pancreatic duct dilatation.      Liver and spleen are normal in size and MRI appearance. There is no  evidence of periportal edema questioned on admission CT. Hepatic  vasculature is widely patent.     No mass, adenopathy or fluid collection within the upper abdomen. Both  kidneys are negative with the exception of benign parapelvic renal  cysts. Normal caliber abdominal aorta with patent central mesenteric  vessels. Normal adrenal glands.       Impression:      1.  Normal MRCP post cholecystectomy. No bile duct dilatation. No  evidence of choledocholithiasis. No evidence of cholangitis or acute  pancreatitis.  2.  Liver is normal in MRI appearance.  3.  Benign bilateral parapelvic renal cysts.     This report was finalized on 7/31/2023 10:21 PM by Dr. Santiago Dominguez MD.       US Liver [[id number]] Collected: 07/31/23 0752     Updated: 07/31/23 0857    Narrative:      EXAM: Liver ultrasound     INDICATION: Cholecystectomy 2 months ago abdominal pain for 3 days and  elevated liver enzymes.     FINDINGS: The visualized portions of the pancreas are normal. The liver  is normal in echogenicity. No fluid collections or masses are visible.  Gallbladder has been removed portal vein shows flow into the liver.  Common bile duct is 4 mm in diameter. Right kidney is 10 cm in length.  Renal pelvis is within normal limits. Patient had a CT scan yesterday  which showed mild prominence of each renal  pelvis versus parapelvic  cysts. Ureters are nondilated.       Impression:      Negative liver ultrasound     This report was finalized on 7/31/2023 8:55 AM by Dr. Denilson Arzate MD.       CT Abdomen Pelvis With Contrast [[id number]] Collected: 07/30/23 2045     Updated: 07/30/23 2157    Narrative:      ABDOMEN PELVIS CT WITH CONTRAST     HISTORY:  Cholecystectomy 2 months ago. Epigastric pain today with abnormal liver  function tests.     TECHNIQUE:   CT abdomen pelvis performed during the intravenous ministration of  contrast. COMPARISON study is from 5/13/2023 prior to the  cholecystectomy. Sagittal and coronal reconstructed imaging obtained.  Oral contrast media also given. Radiation dose reduction techniques  included automated exposure control or exposure modulation based on body  size. Radiation audit for CT and nuclear cardiology exams in the last 12  months: 1.     ABDOMEN FINDINGS:   Lung bases: There is dependent atelectasis.     There our postoperative changes of cholecystectomy. There is no fluid  collection in the postoperative bed. There is mild periportal edema that  is new from the previous study. Please correlate for clinical evidence  of hepatitis/ Liver dysfunction. There is no definite enlargement of the  common bile duct. There may be mild intrahepatic biliary ductal  dilatation in addition to the periportal edema. The pancreas, adrenal  glands, spleen, are unremarkable.        There is streak artifact from spine hardware. There are probably  bilateral parapelvic renal cysts and a tiny right lower pole  nonobstructing renal calculus. No convincing evidence for  hydronephrosis. Appearance of the kidneys is similar to prior.     There are atherosclerotic vascular calcifications involving the  abdominal aorta and branch vessels. No retroperitoneal lymphadenopathy  is suspected.     There is sigmoid diverticulosis. There is no CT evidence for  diverticulitis. There is no evidence for bowel obstruction  or free air.  The appendix is unremarkable.     PELVIS FINDINGS:   Bladder is unremarkable. Uterus and adnexal structures are within normal  limits.     There are degenerative changes in the postoperative spine. There doesn't  appear to be fusion across the L3-4 intervertebral disc spacer.       Impression:      1. Post cholecystectomy. No evidence for fluid collection in the  postoperative bed. There is mild periportal edema that is new from the  prior study. Please correlate for clinical evidence of hepatitis or  liver dysfunction. There may be mild intrahepatic biliary ductal  dilatation but the common bile duct doesn't appear to be dilated.  2. Occasional sigmoid diverticula without evidence of diverticulitis. No  bowel obstruction or free air.  3. Postoperative changes to the spine        This report was finalized on 7/30/2023 9:55 PM by Dr. Roya Mcintosh MD.               PROCEDURES      Condition on Discharge:  Stable, Improved.     Physical Exam at Discharge  Vital Signs  Temp:  [98.1 øF (36.7 øC)-98.7 øF (37.1 øC)] 98.6 øF (37 øC)  Heart Rate:  [74-84] 76  Resp:  [16-17] 16  BP: (103-137)/(61-70) 137/70    Physical Exam  Please see today's progress note    Discharge Disposition  To home in stable condition    Visiting Nurse:    No    Home PT/OT:  No    Home Safety Evaluation:  No    DME  No new equipment    Discharge Diet:      Dietary Orders (From admission, onward)       Start     Ordered    08/01/23 0643  Diet: Cardiac Diets; Healthy Heart (2-3 Na+); Texture: Regular Texture (IDDSI 7); Fluid Consistency: Thin (IDDSI 0)  Diet Effective Now        References:    Diet Order [facility]   Question Answer Comment   Diets: Cardiac Diets    Cardiac Diet: Healthy Heart (2-3 Na+)    Texture: Regular Texture (IDDSI 7)    Fluid Consistency: Thin (IDDSI 0)        08/01/23 0642                    Activity at Discharge:  As tolerated    Pre-discharge education  None      Follow-up Appointments  No future  appointments.  Additional Instructions for the Follow-ups that You Need to Schedule       Discharge Follow-up with PCP   As directed       Currently Documented PCP:    Korey Wylie MD    PCP Phone Number:    [phone]     Follow Up Details: within 1 week                Test Results Pending at Discharge  Pending Labs       Order Current Status    AUDREY Comprehensive Panel In process    Anti-Smooth Muscle Antibody Titer In process    Celiac Comprehensive Panel In process    EBV Antibody Profile In process    Mitochondrial Antibodies, M2 In process    Blood Culture - Blood, Arm, Left Preliminary result    Blood Culture - Blood, Hand, Right Preliminary result            Discharge over 30 min (if over 30 minutes give explanation as to why it took greater than 30 minutes)  Secondary to:   Coordination of care/follow up  Medication reconciliation  D/W patient      At [facility], we believe that sharing information builds trust and better relationships. You are receiving this note because you recently visited [facility]. It is possible you will see health information before a provider has talked with you about it. This kind of information can be easy to misunderstand. To help you fully understand what it means for your health, we urge you to discuss this note with your provider.    Omid Soriano DO  08/01/23  12:27 EDT

## 2023-08-02 ENCOUNTER — READMISSION MANAGEMENT (OUTPATIENT)
Dept: CALL CENTER | Facility: HOSPITAL | Age: 73
End: 2023-08-02
Payer: MEDICARE

## 2023-08-02 LAB
BACTERIA SPEC AEROBE CULT: ABNORMAL
BACTERIA SPEC AEROBE CULT: ABNORMAL
GRAM STN SPEC: ABNORMAL
ISOLATED FROM: ABNORMAL
ISOLATED FROM: ABNORMAL

## 2023-08-02 NOTE — PROGRESS NOTES
Please let the patient know that the labs obtained for her elevated transaminases were normal. I suspect her elevated LFTs are due to ischemic hepatitis which will continue to improve over time know that's she being treated for her E Coli infection.

## 2023-08-07 ENCOUNTER — READMISSION MANAGEMENT (OUTPATIENT)
Dept: CALL CENTER | Facility: HOSPITAL | Age: 73
End: 2023-08-07
Payer: MEDICARE

## 2023-08-07 NOTE — OUTREACH NOTE
LAG < 7 Survey      Flowsheet Row Responses   [facility] patient discharged from? [place]   Does the patient have one of the following disease processes/diagnoses(primary or secondary)? Other   BHLAG <7 Attempt successful? 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 Yes   Is the patient having any side effects they believe may be caused by any medication additions or changes? No   Does the patient have all medications ordered at discharge? Yes   Is the patient taking all medications as directed (includes completed medication regime)? Yes   Does the patient have a primary care provider?  Yes   Does the patient have an appointment with their PCP within 7 days of discharge? Yes   Has the patient kept scheduled appointments due by today? N/A   Has home health visited the patient within 72 hours of discharge? N/A   Psychosocial issues? No   Did the patient receive a copy of their discharge instructions? Yes   Nursing interventions Reviewed instructions with patient   What is the patient's perception of their health status since discharge? Improving   Graduated Yes            Aurora RAMEY - Registered Nurse

## (undated) DEVICE — GLV SURG SENSICARE W/ALOE PF LF 6.5 STRL

## (undated) DEVICE — CATH IV INSYTE AUTOGARD 14G 1 1/2IN ORNG

## (undated) DEVICE — CATH CHOLANG 4.5F18IN BRGNDY

## (undated) DEVICE — 2, DISPOSABLE SUCTION/IRRIGATOR WITH DISPOSABLE TIP: Brand: STRYKEFLOW

## (undated) DEVICE — DRP C/ARM 41X74IN

## (undated) DEVICE — STPCK 3WY D201 DISCOFIX

## (undated) DEVICE — ENDOCUT SCISSOR TIP, DISPOSABLE: Brand: RENEW

## (undated) DEVICE — LAPAROSCOPIC SMOKE FILTRATION SYSTEM: Brand: PALL LAPAROSHIELD® PLUS LAPAROSCOPIC SMOKE FILTRATION SYSTEM

## (undated) DEVICE — PK LAP GEN 90

## (undated) DEVICE — ENDOPOUCH RETRIEVER SPECIMEN RETRIEVAL BAGS: Brand: ENDOPOUCH RETRIEVER

## (undated) DEVICE — SUT VIC 0/0 UR6 27IN DYED J603H

## (undated) DEVICE — CONTAINER,SPECIMEN,OR STERILE,4OZ: Brand: MEDLINE

## (undated) DEVICE — TBG INSUFL W FLTR STRL

## (undated) DEVICE — ENDOPATH XCEL UNIVERSAL TROCAR STABLILITY SLEEVES: Brand: ENDOPATH XCEL

## (undated) DEVICE — SYR LUERLOK 20CC BX/50

## (undated) DEVICE — ST EXT IV TBG W SECUR LK 20IN

## (undated) DEVICE — SUT MNCRYL PLS ANTIB UD 4/0 PS2 18IN

## (undated) DEVICE — SOL IRR H2O BTL 1000ML STRL

## (undated) DEVICE — SYR LUERLOK 30CC

## (undated) DEVICE — ENDOPATH XCEL BLADELESS TROCARS WITH STABILITY SLEEVES: Brand: ENDOPATH XCEL

## (undated) DEVICE — ENDOPATH PNEUMONEEDLE INSUFFLATION NEEDLES WITH LUER LOCK CONNECTORS 120MM: Brand: ENDOPATH

## (undated) DEVICE — TROCAR SITE CLOSURE DEVICE: Brand: ENDO CLOSE

## (undated) DEVICE — ADHS SKIN PREMIERPRO EXOFIN TOPICAL HI/VISC .5ML

## (undated) DEVICE — APPL CHLORAPREP HI/LITE 26ML ORNG

## (undated) DEVICE — LAPAROVUE VISIBILITY SYSTEM LAPAROSCOPIC SOLUTIONS: Brand: LAPAROVUE